# Patient Record
Sex: FEMALE | Race: WHITE
[De-identification: names, ages, dates, MRNs, and addresses within clinical notes are randomized per-mention and may not be internally consistent; named-entity substitution may affect disease eponyms.]

---

## 2017-08-18 NOTE — CARD
--------------- APPROVED REPORT --------------





EXAM: Two-dimensional and M-mode echocardiogram with Doppler and color Doppler.



Other Information 

Quality : Fair



INDICATION

Pulmonary Hypertention 



2D DIMENSIONS 

RVDd3.4 (2.9-3.5cm)Left Atrium(2D)4.2 (1.6-4.0cm)

IVSd1.1 (0.7-1.1cm)Aortic Root(2D)2.2 (2.0-3.7cm)

LVDd4.4 (3.9-5.9cm)LVOT Diameter1.9 (1.8-2.4cm)

PWd1.2 (0.7-1.1cm)LVDs2.7 (2.5-4.0cm)

FS (%) 30.0 %SV58.9 ml

LVEF(%)60.0 (>50%)



Aortic Valve

AoV Peak Kyle.219.2cm/sAoV VTI48.1cm

AO Peak GR.19.2mmHgLVOT Peak Kyle.124.5cm/s

AO Mean GR.12mmHgAVA (VMAX)1.58cm2



Mitral Valve

MV E Ycizkxcz65.1cm/sMV DECEL [id number]

MV A Zwqqxruw472.4cm/sE/A  Ratio0.7



Tricuspid Valve

TR P. Sshgrbto593bd/sRAP GZLKZUXP0lyEb

TR Peak Gr.96ycNnTKKA95vkOy



Pulmonary Vein

S1 Csfhfawm95.9cm/sD2 Ejgiekcs56.6cm/s

PVa [id number]



 LEFT VENTRICLE 

The left ventricle is normal size. There is mild concentric left ventricular hypertrophy. The left ve
ntricular systolic function is normal and the ejection fraction is within normal range. The Ejection 
Fraction is 60-65%. There is normal LV segmental wall motion. Transmitral Doppler flow pattern is Gra
de I-abnormal relaxation pattern.



 RIGHT VENTRICLE 

The right ventricle is normal size. The right ventricular systolic function is normal. There is a pac
emaker lead in the right ventricle.



 ATRIA 

The left atrium is mildly dilated. The right atrium is mildly dilated. A pacemaker is seen in the rig
ht atrium consistent with history. The interatrial septum is intact with no evidence for an atrial se
ptal defect or patent foramen ovale as noted on 2-D or Doppler imaging.



 AORTIC VALVE 

The aortic valve is calcified but opens well. Doppler and Color Flow revealed no significant aortic r
egurgitation. There is no significant aortic valvular stenosis.



 MITRAL VALVE 

The mitral valve is calcified but opens well. There is no evidence of mitral valve prolapse. There is
 no mitral valve stenosis. Doppler and Color-flow revealed trace to mild mitral regurgitation.



 TRICUSPID VALVE 

The tricuspid valve is normal in structure and function. Doppler and Color Flow revealed physiologica
l tricuspid regurgitation. There is mild pulmonary hypertension. The PA pressure was estimated at 36 
mmHg. There is no tricuspid valve stenosis.



 PULMONIC VALVE 

Doppler and Color Flow revealed trace pulmonic valvular regurgitation. There is no pulmonic valvular 
stenosis.



 GREAT VESSELS 

The aortic root is normal in size. The ascending aorta is normal in size. The IVC is normal in size a
nd collapses >50% with inspiration.



 PERICARDIAL EFFUSION 

There is no evidence of significant pericardial effusion.



Critical Notification

Critical Value: No



<Conclusion>

The left ventricular systolic function is normal and the ejection fraction is within normal range. Th
e Ejection Fraction is 60-65%.

There is normal LV segmental wall motion.

There is a pacemaker lead in the right ventricle.

## 2017-10-09 NOTE — CARD
--------------- APPROVED REPORT --------------





Procedure(s) performed: Right Heart Cath, Coronaries, LV

Moderate Sedation: 51 Minutes



HISTORY

The patient is a 77 year-old female with a history of : previous CHF, coronary artery disease, hypert
ension, dyslipidemia.



INDICATION

The indication(s) include : dyspnea.



PROCEDURE NARRATIVE

The patient was brought electively to the cardiac catheterization lab.  A timeout was performed confi
rming the patient's name, date of birth, procedure, and site of procedure.  All necessary personnel w
ere wearing the appropriate protective equipment and radiation monitor devices.  After explaining the
 risks and benefits of the procedure and alternatives, informed consent was obtained. (See nursing no
yoan for medications administered).  The right groin was sterilely prepped and draped in the usual fas
hion.  The right groin was infiltrated with 20 mL of 2% lidocaine for subcutaneous anesthesia.  A 5 F
sheath was inserted into the right femoral artery without difficulty via the modified seldinger techn
ique with an 18G needle and a J-tipped guidewire. Next, an 6Fr sheath was inserted in the right commo
n femoral vein in similar fashion without difficulty.  



A PA catheter was then advanced through the right heart chambers, pressures and saturations were obta
ined. Subsequently, right and left coronary angiography was performed using standard JR4 and JL4 diag
nostic catheters.  Left ventricular end diastolic pressure was obtained with a pigtail catheter and p
ullback was performed after left ventriculography. 



HEMODYNAMICS: 

LVEDP 21 mm Hg

AO: 156/82

*No gradient on LV to aortic pullback. 



PCWP: 20 mm Hg

PA: 54/26/38

RV: 57/10/16

RA: 11 mm Hg

Jt: 4.5 L/min



PA saturation: 73%

FA saturation: 95%



LEFT VENTRICULOGRAM: Deferred due to renal insufficiency.



CORONARY ANGIOGRAPHY: 

LM is a moderate to large caliber vessel with normal angiographic appearance. 

LAD is a moderate caliber vessel with patent mid stents. 

D1 is a small caliber vessel with an ostial 70% stenosis. There is an epicardial collateral to the di
stal segment of this vessel. 

LCx is a small to moderate caliber non-dominant vessel with mild luminal irregularities. 

RCA is a large caliber dominant vessel with patent proximal and mid stents and mild luminal irregular
ities. 

RPDA and RPL are small to moderate caliber vessels with normal angiographic appearance.



Conclusion

1. No significant coronary artery disease, patent LAD and RCA stents. 

2. No significant pulmonary HTN. 

3. No clear cardiac source of exertional dyspnea noted. 



Recommendations

Aggressive Medical Therapy

## 2017-10-09 NOTE — PDOC
MODERATE SEDATION ASSESSMENT


RISKS/ALTERNATIVES


Risks/Alternatives


Risks and alternatives of this type of sedation and procedure discussed with:


RISK/ALTERNATIVES:  Patient





H & P ON CHART


H & P


H & P on chart and reviewed for co-morbid conditions and appropriate labs.


H&P ON CHART:  Yes





PREGNANCY STATUS


PREG STATUS ASSESSED:  N/A





MEDS/ALLERGIES REVIEWED


Meds/Allergies Reviewed


Medications and Allergies including time and route of recently administered 

narcotics and sedatives.


MEDS/ALLERGIES REVIEWED:  Yes





ASA RATING


ASA RATING:  III





AIRWAY ASSESSMENT


Airway Assessment


Airway patency, oral function limitations, presence  of caps, crowns, dentures, 

partials, and ability to extend neck assessed.


AIRWAY ASSESSMENT:  Yes





MALLAMPATI SCORE


MALLAMPATI SCORE:  II





PRE-SEDATION ASSESSMENT


PRE-SEDATION ASSESSMENT:  Yes











HYUN BELLA MD Oct 9, 2017 09:08

## 2017-11-29 NOTE — PHYS DOC
Past Medical History


Past Medical History:  Anemia, CAD, COPD, Depression, Diabetes-Type II, High 

Cholesterol, Hypertension, Hypothyroid, Renal Disease


Past Surgical History:  Angioplasty, Appendectomy, Cholecystectomy, Hysterectomy

, Pacemaker, Tonsillectomy


Additional Past Surgical Histo:  6 cardiac stents,


Alcohol Use:  None


Drug Use:  None





Adult General


Chief Complaint


Chief Complaint:  HIP PAIN





[facility]


HPI





Patient is a 77 year old female with history of chronic back pain who presents 

with persistent right lower paralumbar paravertebral back pain radiating to 

right leg for the past several days. Patient states she fell 4 days prior to 

arrival has had increased pain. Patient has been able to her in the process of 

moving is been lifting boxes, bending and twisting repeatedly which is more 

than her normal activity. Has had increased pain but did not receive medical 

evaluation until today. Patient was Rx'd Hydrocodone and is awaiting 

arrangements for outpatient imaging studies. Patient states she took 

hydrocodone 3 hours prior to ED arrival with limited improvement. Denies motor 

weakness or loss of sensation. No bladder incontinence. No other acute symptoms 

or complaints.





Review of Systems


Review of Systems





ROS as per HPI.  All other ROS are negative.]





All other systems were reviewed and found to be within normal limits, except as 

documented in this note.





Current Medications


Current Medications





Current Medications








 Medications


  (Trade)  Dose


 Ordered  Sig/Nga  Start Time


 Stop Time Status Last Admin


Dose Admin


 


 Fentanyl Citrate


  (Fentanyl 2ml


 Vial)  150 mcg  1X  ONCE  17 21:30


 17 21:31 DC 17 21:25


150 MCG


 


 Ketorolac


 Tromethamine


  (Toradol Im)  60 mg  1X  ONCE  17 00:30


 17 00:31 DC 17 00:23


60 MG


 


 Ondansetron HCl


  (Zofran Odt)  4 mg  1X  ONCE  17 21:30


 17 21:31 DC 17 21:24


4 MG











Allergies


Allergies





Allergies








Coded Allergies Type Severity Reaction Last Updated Verified


 


  Iodinated Contrast- Oral and IV Dye Allergy Intermediate  10/31/16 Yes


 


  Tetracyclines Allergy Intermediate  10/31/16 Yes


 


  morphine Allergy Intermediate  10/31/16 Yes


 


  nitrofurantoin Allergy Intermediate  10/31/16 Yes











Physical Exam


Physical Exam





Constitutional: Well developed, well nourished, discomfort secondary to pain.[]


HENT: Normocephalic, atraumatic, bilateral external ears normal, oropharynx 

moist, no oral exudates, nose normal. []


Eyes: PERRLA, EOMI, conjunctiva normal. [] 


Neck: Normal range of motion. [] 


Cardiovascular:Heart rate regular rhythm, no murmur []


Lungs & Thorax:  Bilateral breath sounds clear to auscultation []


Abdomen: Bowel sounds normal, soft, no tenderness. [] 


Skin: Warm, dry, no erythema, no rash. [] 


Back: Diffuse low back pain, no midline bony tenderness, swelling or step off 

appreciated. [] 


Extremities: R hip, no deformity swelling, hip rotation, swelling or shortening 

of the extremity.. [] 


Neurologic: Alert and oriented X 3, R lower extremity , normal motor function, 

normal sensory function, no focal deficits noted. []


Psychologic: Affect, anxious. []





Current Patient Data


Vital Signs





 Vital Signs








  Date Time  Temp Pulse Resp B/P (MAP) Pulse Ox O2 Delivery O2 Flow Rate FiO2


 


17 22:15      Room Air  


 


17 20:58 98.3 70 20 177/76 (109) 98   





 98.3       











EKG


EKG


[]





Radiology/Procedures


Radiology/Procedures


[X-ray right hip/lumbar series: No obvious displaced fracture.





CT lumbar spine/pelvis: No acute findings per radiology report]





Course & Med Decision Making


Course & Med Decision Making


Pertinent Labs and Imaging studies reviewed. (See chart for details)





[No for occult neurologic deficits. Pain addressed. Showing stenosis likely 

contributing to pain. PCP follow up. ]





Dragon Disclaimer


Dragon Disclaimer


This electronic medical record was generated, in whole or in part, using a 

voice recognition dictation system.





Departure


Departure


Impression:  


 Primary Impression:  


 Back pain


 Additional Impression:  


 Lumbar radicular pain


Disposition:   HOME, SELF-CARE


Condition:  


Referrals:  


DANIELLA LINTON MD (PCP)





Problem Qualifiers











YARY FAUSTIN DO 2017 22:01

## 2017-11-29 NOTE — RAD
CT lumbar spine without contrast

 

History: Back pain

 

Axial helical images of the lumbar spine were obtained without contrast. 

Axial, coronal and sagittal reconstruction was performed.

 

Findings:

 

There is grade 1 anterolisthesis of L4 on L5 and L5 on S1. There is no 

loss of vertebral body stature. 

 

Evaluation of the central canal is limited without contrast. 

 

Diffuse circumferential disc bulge and hypertrophy of the facets and 

ligamentum flavum results in moderate-to-marked central stenosis at L4-5 

with crowding of the lateral recesses lateral recesses bilaterally right 

worse than left. There is moderate narrowing of the neuroforamen 

bilaterally at L4-5 and L5-S1 with loss of fat around the exiting nerve 

roots bilaterally at L4-L5.

 

There is a 2.4 cm intermediate density lesion arising from the mid right 

kidney posterior laterally.

 

Impression:

 

 

1. Marked degenerative changes of the lumbar spine with significant 

central and neuroforaminal stenosis at L4-L5.

2. There appears to be compression of the intraforaminal course of the 

exiting nerve roots bilaterally at this level however, there is also 

likely compression of the L5 nerve roots before the end of the 

neuroforamen.

3. Indeterminate lesion in the right kidney. Recommend follow-up 

ultrasound of the kidneys as an outpatient.

 

PQRS Compliance Statement:

 

One or more of the following individualized dose reduction techniques were

utilized for this examination:  

1. Automated exposure control  

2. Adjustment of the mA and/or kV according to patient size  

3. Use of iterative reconstruction technique

 

Electronically signed by: Sage Sosa III, MD (11/29/2017 10:26 PM) 

[facility]

## 2017-11-29 NOTE — RAD
CT of pelvis without contrast:

 

HISTORY: Right hip pain

 

Axial helical images of the pelvis were obtained without contrast.

 

 

 

The uterus is not well seen and could be small or surgically absent.  The 

ovaries are not well seen and are likely small.  There is no 

lymphadenopathy or free fluid. The bladder appears normal. There is no 

pericolonic inflammation.

 

The visualized osseous structures appear intact.

 

Impression:

 

No acute findings.

 

 

PQRS Compliance Statement:

 

One or more of the following individualized dose reduction techniques were

utilized for this examination:  

1. Automated exposure control  

2. Adjustment of the mA and/or kV according to patient size  

3. Use of iterative reconstruction technique

 

Electronically signed by: Sage Ssoa III, MD (11/29/2017 10:41 PM) 

[facility]

## 2017-11-30 NOTE — RAD
Lumbar spine, 3 views, 11/29/2017:



History: Back pain, injury



There is moderate disc space narrowing and marginal spurring at L5-S1. The

other intervertebral disc spaces are well-maintained. There are scattered mild

marginal spurs. There are moderate degenerative changes involving the facet

joints in the lower lumbar spine. There is a minimal anterolisthesis at L5-S1.

No acute fracture is identified. Aortic calcific plaquing is present.



IMPRESSION:

1. Moderate degenerative change in the lower lumbar spine.

2. No acute bony abnormality is detected.

## 2017-11-30 NOTE — RAD
Pelvis with right hip, 3 views, 11/29/2017:



History: Trauma, injury



No fracture or dislocation is identified. The hip joint spaces are well

preserved with only mild marginal spurring. There are degenerative changes at

the symphysis pubis and in the lower lumbar spine. Surgical clips are present

the right groin. Aortoiliac calcific plaquing is noted.



IMPRESSION: No acute bony abnormality is detected.

## 2017-12-04 NOTE — PHYS DOC
Past Medical History


Past Medical History:  Anemia, CAD, COPD, Depression, Diabetes-Type II, High 

Cholesterol, Hypertension, Hypothyroid, Renal Disease


Past Surgical History:  Angioplasty, Appendectomy, Cholecystectomy, Hysterectomy

, Pacemaker, Tonsillectomy


Additional Past Surgical Histo:  6 cardiac stents,


Alcohol Use:  None


Drug Use:  None





Adult General


Chief Complaint


Chief Complaint:  HIP PAIN





[facility]


HPI





Patient is a 77  year old female with history of COPD, diabetes type 2, 

hypertension, anemia, who presents today complaining of moderate right low back 

pain radiating into the right lower extremity that began on Thanksgiving day 

after she fell. Patient was seen in the ED on November 29, 2017, she had lumbar 

spine CTs which were negative for any acute findings and right hip x-rays which 

were negative for any acute findings. She states she was sent home with 

hydrocodone and Flexeril. Patient states her pain is still ongoing. Patient 

denies any injury. Denies any loss of bowel bladder function. Denies any 

numbness or tingling to bilateral lower extremities. She states she has an 

appointment with her PCP  tomorrow morning.





Review of Systems


Review of Systems





Constitutional: Denies fever or chills []


Eyes: Denies change in visual acuity, redness, or eye pain []


HENT: Denies nasal congestion or sore throat []


Respiratory: Denies cough or shortness of breath []


Cardiovascular: No additional information not addressed in HPI []


GI: Denies abdominal pain, nausea, vomiting, bloody stools or diarrhea []


: Denies dysuria or hematuria []


Musculoskeletal: Right low back pain radiating into the right lower extremity


Integument: Denies rash or skin lesions []


Neurologic: Denies headache, focal weakness or sensory changes []








All other systems were reviewed and found to be within normal limits, except as 

documented in this note.





Current Medications


Current Medications





Current Medications








 Medications


  (Trade)  Dose


 Ordered  Sig/Nga  Start Time


 Stop Time Status Last Admin


Dose Admin


 


 Diazepam


  (Valium)  5 mg  1X  ONCE  12/4/17 20:30


 12/4/17 20:31 DC  


 


 


 Fentanyl Citrate


  (Fentanyl 2ml


 Vial)  50 mcg  1X  ONCE  12/4/17 20:30


 12/4/17 20:31 DC  


 


 


 Ketorolac


 Tromethamine


  (Toradol Im)  60 mg  1X  ONCE  12/4/17 20:30


 12/4/17 20:31 DC  


 











Allergies


Allergies





Allergies








Coded Allergies Type Severity Reaction Last Updated Verified


 


  Iodinated Contrast- Oral and IV Dye Allergy Intermediate  10/31/16 Yes


 


  Tetracyclines Allergy Intermediate  10/31/16 Yes


 


  morphine Allergy Intermediate  10/31/16 Yes


 


  nitrofurantoin Allergy Intermediate  10/31/16 Yes











Physical Exam


Physical Exam





Constitutional: Well developed, well nourished, no acute distress, non-toxic 

appearance. []


HENT: Normocephalic, atraumatic, bilateral external ears normal, oropharynx 

moist, no oral exudates, nose normal. []


Eyes: PERRLA, EOMI, conjunctiva normal, no discharge. [] 


Neck: Normal range of motion, no tenderness, supple, no stridor. [] 


Cardiovascular:Heart rate regular rhythm, no murmur []


Lungs & Thorax:  Bilateral breath sounds clear to auscultation []


Abdomen: Bowel sounds normal, soft, no tenderness, no masses, no pulsatile 

masses. [] 


Skin: Warm, dry, no erythema, no rash. [] 


Back: Diffuse tenderness to the right lumbar spine worse on the right SI joint, 

no midline lumbar spine tenderness, no CVA tenderness. [] 


Extremities: No tenderness, no cyanosis, no clubbing, ROM intact, no edema. [] 


Neurologic: Alert and oriented X 3, normal motor function, normal sensory 

function, no focal deficits noted. []


Psychologic: Affect normal, judgement normal, mood normal. []





Current Patient Data


Vital Signs





 Vital Signs








  Date Time  Temp Pulse Resp B/P (MAP) Pulse Ox O2 Delivery O2 Flow Rate FiO2


 


12/4/17 19:50 97.6 95 16  98 Room Air  





 97.6       











EKG


EKG


[]





Radiology/Procedures


Radiology/Procedures


[]





Course & Med Decision Making


Course & Med Decision Making


Pertinent Labs and Imaging studies reviewed. (See chart for details)





Patient is in the ED with complaints of right low-back pain radiating into the 

right lower extremity that began on Thanksgiving day after she fell. They did a 

CT  of her lumbar spine as well as x-rays of the right hip which were negative 

for any acute findings. She is currently taking cyclobenzaprine and hydrocodone 

with no relief. I talked to patient about management of her pain considering 

she is 77 years old. Informed her she can be admitted for pain management. She 

states she has an appointment with her PCP tomorrow morning. She'll be given 

Toradol and Valium and fentanyl in the ED. She'll be discharged with Valium and 

to continue taking her fentanyl and follow-up with her own PCP tomorrow morning 

at 11 AM. Recommended pain clinic doctor follow up as well.





Dragon Disclaimer


Dragon Disclaimer


This electronic medical record was generated, in whole or in part, using a 

voice recognition dictation system.





Departure


Departure


Impression:  


 Primary Impression:  


 Back pain


 Additional Impression:  


 Lumbar radicular pain


Disposition:  01 HOME, SELF-CARE


Condition:  STABLE


Referrals:  


DANIELLA LINTON MD (PCP)


follow up tomorrow





MARGI FRANCIS MD


follow up in one week


Patient Instructions:  Back Pain, Adult, Sciatica, Easy-to-Read





Additional Instructions:  


You were seen with pain radiating to the right lower extremity. You have an 

appointment with their doctor tomorrow. Ensure you follow-up. We also provided 

you a pain clinic doctor, contact the office tomorrow and set up a follow-up 

appointment. Return to the ED at any point symptoms worsen.


Scripts


Hydrocodone Bit/Acetaminophen (HYDROCODONE-APAP 7.5-325  **) 1 Each Tablet


1 TAB PO PRN Q6HRS Y for PAIN, #10 TAB 0 Refills


   Prov: ROSALVA SPIVEY         12/4/17 


Diazepam (VALIUM) 5 Mg Tablet


5 MG PO TID, #15 TAB


   Prov: ROSALVA SPIVEY         12/4/17





Problem Qualifiers








 Primary Impression:  


 Back pain


 Back pain location:  low back pain  Chronicity:  acute  Back pain laterality:  

right  Sciatica presence:  with sciatica  Sciatica laterality:  sciatica of 

right side  Qualified Codes:  M54.41 - Lumbago with sciatica, right side








ROSALVA SPIVEY Dec 4, 2017 20:32

## 2017-12-12 NOTE — KCIC
RENAL ARTERY ARTERIAL DOPPLER ULTRASOUND

 

Indication: Chronic kidney disease stage III. Kidney mass seen on CT 

lumbar spine.

 

Comparison: CT lumbar spine without contrast, November 29, 2017.

 

Technique: Multiple grayscale, color flow, and Doppler spectral waveform 

analysis images of the abdominal aorta and renal arteries were obtained.

 

Findings: 

Left kidney technically difficult to visualize. Patient unable to lay flat

or towards the right.

 

Abdominal aorta peak systolic velocity: 94 cm/sec.

 

Right main renal artery peak systolic velocity: 93 cm/sec. 

Right renal artery to aorta ratio: 1

 

Left main renal artery peak systolic velocity: 100 cm/sec.

Left renal artery to aorta ratio: 1.1

 

The bilateral proximal renal arteries are unable to be visualized.

 

Renal veins are patent. 

 

The right kidney measures 8.6 cm. The left kidney measures 10.2 cm. 

Kidneys are normal in echotexture. No hydronephrosis. There is a right 

upper pole round hypoechoic lesion measuring 2.4 x 2.1 x 2.5 cm. There may

be increased through transmission. The margins are irregular. Do not see a

color Doppler interrogation image of the lesion.

 

Urinary bladder is not well seen, no obvious abnormality.

 

IMPRESSION:

1.  The lesion in the upper pole of the right kidney is indeterminate by 

ultrasound. There are internal echoes and irregular margins. A color 

Doppler image interrogating this lesion is not saved. Recommend further 

evaluation with multiphase CT abdomen with and without contrast.

2.  No evidence of hemodynamically significant renal artery stenosis.

 

Electronically signed by: Desmond Huertas MD (12/12/2017 12:56 PM) [id number]

## 2018-10-17 NOTE — CARD
MR#: [id number]

Account#: [id number]

Accession#: [id number]

Date of Study: 10/17/2018

Ordering Physician: HYUN CH, 

Referring Physician: HYUN CH, 

Tech: Elvie Camara





--------------- APPROVED REPORT --------------





EXAM: Two-dimensional and M-mode echocardiogram with Doppler and color Doppler.



Other Information 

Quality : FairHR: 60bpm



INDICATION

COPD  

Coronary Arteriosclerosis



Surgery/Intervention

Pacemaker: 



RISK FACTORS

Hypertension 

Obesity   

Hyperlipidemia

Diabetes



2D DIMENSIONS 

Left Atrium(2D)4.3 (1.6-4.0cm)IVSd1.0 (0.7-1.1cm)

Aortic Root(2D)2.9 (2.0-3.7cm)LVDd5.5 (3.9-5.9cm)

LVOT Diameter2.1 (1.8-2.4cm)PWd1.3 (0.7-1.1cm)

LVDs3.0 (2.5-4.0cm)FS (%) 45.1 %

.9 mlLVEF(%)75.9 (>50%)



Aortic Valve

AoV Peak Kyle.246.9cm/sAoV VTI61.3cm

AO Peak GR.24.4mmHgLVOT Peak Kyle.90.4cm/s

LVOT  VTI 19.09cmAO Mean GR.14mmHg

THOMAS (VMAX)0.93kx8CWI   (VTI)1.09cm2



Mitral Valve

MV E Ikqhngry493.5cm/sMV DECEL [id number]

MV A Qsfeuqlx368.4cm/sMV DMO59bj

E/A  Ratio1.1MVA (PHT)5.30cm2



TDI

E/Lateral E'17.7E/Medial E'16.1



Pulmonary Valve

PV Peak Xojzsrsx503.4cm/sPV Peak Grad.7mmHg



Tricuspid Valve

TR P. Yzqbpeid718vg/sRAP CBFXPAKX0hdUc

TR Peak Gr.63jsHpFOEV16jrCq



Pulmonary Vein

S1 Usppzoil80.4cm/sD2 Nlukhhcs10.0cm/s



 LEFT VENTRICLE 

The Left Ventricle is mildly dilated. There is borderline concentric left ventricular hypertrophy. Th
e left ventricular systolic function is normal and the ejection fraction is within normal range. The 
Ejection Fraction is 55-60%. Suboptimal images. Grossly normal wall motion. Transmitral Doppler flow 
pattern is Grade I-abnormal relaxation pattern.



 RIGHT VENTRICLE 

The right ventricle is normal size. There is normal right ventricular wall thickness. The right ventr
icular systolic function is normal.



 ATRIA 

The left atrium size is normal. The right atrium size is normal. The interatrial septum is intact wit
h no evidence for an atrial septal defect or patent foramen ovale as noted on 2-D or Doppler imaging.




 AORTIC VALVE 

The aortic valve is calcified and displays decreased opening. Doppler and Color Flow revealed trace a
ortic regurgitation. Calculated aortic valve area is 1.3 cm2 with maximum pressure gradient of 25 mmH
g and mean pressure gradient of 14 mmHg.



 MITRAL VALVE 

The mitral valve is normal in structure and function. There is no mitral valve stenosis. Doppler and 
Color-flow revealed trace mitral regurgitation.



 TRICUSPID VALVE 

The tricuspid valve is not well visualized. Doppler and Color Flow revealed trace tricuspid regurgita
tion. RVSP of 26 mm Hg. There is no tricuspid valve stenosis. 



 PULMONIC VALVE 

The pulmonic valve is not well visualized. Doppler and Color Flow revealed trace pulmonic valvular re
gurgitation. There is no pulmonic valvular stenosis.



 GREAT VESSELS 

The aortic root is normal in size. The IVC was not visualized.



 PERICARDIAL EFFUSION 

There is no evidence of significant pericardial effusion.



Critical Notification

Critical Value: No



<Conclusion>

The left ventricular systolic function is normal and the ejection fraction is within normal range. Th
e Ejection Fraction is 55-60%.

Suboptimal images. Grossly normal wall motion.

Probable mild aortic stenosis. 

Doppler and Color Flow revealed trace tricuspid regurgitation. RVSP of 26 mm Hg.



Signed by : Hyun Ch, 

Electronically Approved : 10/17/2018 09:48:28

## 2019-02-26 NOTE — PHYS DOC
Past Medical History


Past Medical History:  Anemia, Anxiety, CAD, COPD, Depression, Diabetes-Type II

, High Cholesterol, Hypertension, Hypothyroid, Renal Disease


Past Surgical History:  Angioplasty, Appendectomy, Cholecystectomy, Hysterectomy

, Pacemaker, Tonsillectomy


Additional Past Surgical Histo:  6 cardiac stents,


Alcohol Use:  Sober


Drug Use:  None





Adult General


Chief Complaint


Chief Complaint:  CHEST PAIN





HPI


HPI





Patient is a 79-year-old female who presents with complaint of chest discomfort 

that started earlier today at about 12:30 PM. Patient states that she thinks it 

may just be an anxiety attack. She rates pain at a 2 out of 10 and states that 

it feels like a tightness. She states that at times it goes all the way across 

her chest but currently is just in the center of her chest. She denies any 

nausea, vomiting or diaphoresis. She states that pain is worsened with exertion.





Review of Systems


Review of Systems





Constitutional: Denies fever or chills []


Respiratory: Denies cough or shortness of breath []


Cardiovascular: No additional information not addressed in HPI []


GI: Denies abdominal pain, nausea, vomiting or diarrhea []


Neurologic: Denies headache, focal weakness or sensory changes []





All other systems were reviewed and found to be within normal limits, except as 

documented in this note.





Current Medications


Current Medications





Current Medications








 Medications


  (Trade)  Dose


 Ordered  Sig/[facility]  Start Time


 Stop Time Status Last Admin


Dose Admin


 


 Aspirin


  ([facility]


 Aspirin)  324 mg  1X  ONCE  2/26/19 18:30


 2/26/19 18:31 DC 2/26/19 19:25


324 MG


 


 Lorazepam


  (Ativan)  1 mg  1X  ONCE  2/26/19 18:30


 2/26/19 18:31 DC 2/26/19 19:26


1 MG


 


 Nitroglycerin


  (Nitrostat)  0.4 mg  PRN Q5MIN  PRN  2/26/19 18:30


 2/27/19 18:29  2/26/19 19:29


0.4 MG


 


 Sodium Chloride  1,000 ml @ 


 100 mls/hr  Q10H  2/26/19 18:26


 2/27/19 04:25  2/26/19 19:25


100 MLS/HR











Allergies


Allergies





Allergies








Coded Allergies Type Severity Reaction Last Updated Verified


 


  Iodinated Contrast- Oral and IV Dye Allergy Intermediate  10/31/16 Yes


 


  Tetracyclines Allergy Intermediate  10/31/16 Yes


 


  morphine Allergy Intermediate  10/31/16 Yes


 


  nitrofurantoin Allergy Intermediate  10/31/16 Yes











Physical Exam


Physical Exam





Constitutional: Well developed, well nourished, no acute distress, non-toxic 

appearance. []


HENT: Normocephalic, atraumatic, bilateral external ears normal, oropharynx 

moist, no oral exudates, nose normal. []


Eyes: PERRLA, EOMI, conjunctiva normal, no discharge. [] 


Neck: Normal range of motion, no tenderness, supple, no stridor. [] 


Cardiovascular: Regular rate and rhythm[]


Lungs & Thorax:  Bilateral breath sounds clear to auscultation []


Abdomen: Bowel sounds normal, soft, no tenderness. [] 


Skin: Warm, dry, no erythema, no rash. [] 


Extremities: No tenderness, no cyanosis, no clubbing, ROM intact, with 

nonpitting edema. [] 


Neurologic: Alert and oriented X 3, no focal deficits noted. []


Psychologic: Anxious. []





Current Patient Data


Vital Signs





 Vital Signs








  Date Time  Temp Pulse Resp B/P (MAP) Pulse Ox O2 Delivery O2 Flow Rate FiO2


 


2/26/19 19:29  70  168/66    


 


2/26/19 18:21 98.0  18  99 Room Air  





 98.0       








Lab Values





 Laboratory Tests








Test


 2/26/19


18:33


 


White Blood Count


 5.9 x10^3/uL


(4.0-11.0)


 


Red Blood Count


 3.82 x10^6/uL


(3.50-5.40)


 


Hemoglobin


 11.6 g/dL


(12.0-15.5)  L


 


Hematocrit


 35.7 %


(36.0-47.0)  L


 


Mean Corpuscular Volume


 94 fL ()





 


Mean Corpuscular Hemoglobin 30 pg (25-35)  


 


Mean Corpuscular Hemoglobin


Concent 32 g/dL


(31-37)


 


Red Cell Distribution Width


 13.4 %


(11.5-14.5)


 


Platelet Count


 221 x10^3/uL


(140-400)


 


Neutrophils (%) (Auto) 57 % (31-73)  


 


Lymphocytes (%) (Auto) 30 % (24-48)  


 


Monocytes (%) (Auto) 7 % (0-9)  


 


Eosinophils (%) (Auto) 5 % (0-3)  H


 


Basophils (%) (Auto) 1 % (0-3)  


 


Neutrophils # (Auto)


 3.4 x10^3uL


(1.8-7.7)


 


Lymphocytes # (Auto)


 1.8 x10^3/uL


(1.0-4.8)


 


Monocytes # (Auto)


 0.4 x10^3/uL


(0.0-1.1)


 


Eosinophils # (Auto)


 0.3 x10^3/uL


(0.0-0.7)


 


Basophils # (Auto)


 0.1 x10^3/uL


(0.0-0.2)


 


Sodium Level


 143 mmol/L


(136-145)


 


Potassium Level


 3.9 mmol/L


(3.5-5.1)


 


Chloride Level


 104 mmol/L


()


 


Carbon Dioxide Level


 27 mmol/L


(21-32)


 


Anion Gap 12 (6-14)  


 


Blood Urea Nitrogen


 37 mg/dL


(7-20)  H


 


Creatinine


 1.5 mg/dL


(0.6-1.0)  H


 


Estimated GFR


(Cockcroft-Gault) 33.7  





 


BUN/Creatinine Ratio 25 (6-20)  H


 


Glucose Level


 94 mg/dL


(70-99)


 


Calcium Level


 9.3 mg/dL


(8.5-10.1)


 


Magnesium Level


 1.5 mg/dL


(1.8-2.4)  L


 


Total Bilirubin


 0.2 mg/dL


(0.2-1.0)


 


Aspartate Amino Transferase


(AST) 15 U/L (15-37)





 


Alanine Aminotransferase (ALT)


 15 U/L (14-59)





 


Alkaline Phosphatase


 61 U/L


()


 


Troponin I Quantitative


 < 0.017 ng/mL


(0.000-0.055)


 


NT-Pro-B-Type Natriuretic


Peptide 249 pg/mL


(0-449)


 


Total Protein


 7.6 g/dL


(6.4-8.2)


 


Albumin


 3.2 g/dL


(3.4-5.0)  L


 


Albumin/Globulin Ratio


 0.7 (1.0-1.7)


L


 


Lipase


 604 U/L


()  H





 Laboratory Tests


2/26/19 18:33








 Laboratory Tests


2/26/19 18:33











EKG


EKG


[]


Interpretation Time:


EKG demonstrates a ventricular paced rhythm.





Radiology/Procedures


Radiology/Procedures


[]





Course & Med Decision Making


Course & Med Decision Making


Pertinent Labs and Imaging studies reviewed. (See chart for details)





[]





Dragon Disclaimer


Dragon Disclaimer


This electronic medical record was generated, in whole or in part, using a 

voice recognition dictation system.





Departure


Departure


Impression:  


 Primary Impression:  


 Chest pain


Disposition:  09 ADMITTED AS INPATIENT


Admitting Physician:  eDx Arrieta


Condition:  IMPROVED


Referrals:  


DANIELLA LINTON MD (PCP)





Problem Qualifiers








 Primary Impression:  


 Chest pain


 Chest pain type:  unspecified  Qualified Codes:  R07.9 - Chest pain, 

unspecified








MERCED MARCANO Jr. DO Feb 26, 2019 18:30

## 2019-02-26 NOTE — NUR
Pt arrived to room 203 per cart accompanied by son's at 2110. Pt assisted to bed with 
assist. Pt denies pain at this time. Poc explained assesment completed vs obtained and 
stable. Call light in reach will resume care and continue to monitor pt.

## 2019-02-27 NOTE — PDOC1
History and Physical


Date of Admission


Date of Admission


92/27/2019





Identification/Chief Complaint


Chief Complaint


My chest hurts





Source


Source:  Chart review, Patient





History of Present Illness


History of Present Illness


Jass is a79 year old female with multiple comorbidities who was in her usual 

state of health until the day prior to her admission when she reported a 

discomfort over ther chest that she describes as a pressure "around her chest" 

Patient says the episodes are intermittent lasting probably less than 10 minutes

, she deneis diaphoresis no sensation of impending doom, no nausea or 

respiratory distres reported no histoyr of recent cough sputum production fever 

or chills reported. SHe sii being admitted at the request of the ER for further 

evaluation anad treatment of her chest discomfort





Of note is that the patient has some social issues at home that probably 

trigger her symtpoms that byron ther to the hospital work up has been negative 

so far.





Past Medical History


Cardiovascular:  CAD, HTN, MI, Hyperlipidemia, Other


Pulmonary:  COPD


CENTRAL NERVOUS SYSTEM:  Periperal neuropathy


GI:  GERD


Heme/Onc:  No pertinent hx


Hepatobiliary:  No pertinent hx


Psych:  No pertinent hx


Rheumatologic:  No pertinent hx


Infectious disease:  No pertinent hx


Renal/:  No pertinent hx


Endocrine:  Diabetes, Hypothyroidism





Past Surgical History


Past Surgical History:  Pacemaker, Appendectomy, Cholecystectomy, Tonsillectomy

, Hysterectomy, Other





Family History


Family History:  Coronary Artery Disease





Social History


Smoke:  No


ALCOHOL:  none


Drugs:  None





Current Problem List


Problem List


Problems


Medical Problems:


(1) Chest pain


Status: Acute  











Current Medications


Current Medications





Current Medications








 Medications


  (Trade)  Dose


 Ordered  Sig/[facility]  Start Time


 Stop Time Status Last Admin


Dose Admin


 


 Aspirin


  ([facility]


 Aspirin)  324 mg  1X  ONCE  2/26/19 18:30


 2/26/19 18:31 DC 2/26/19 19:25


324 MG


 


 Lorazepam


  (Ativan)  1 mg  1X  ONCE  2/26/19 18:30


 2/26/19 18:31 DC 2/26/19 19:26


1 MG


 


 Nitroglycerin


  (Nitrostat)  0.4 mg  PRN Q5MIN  PRN  2/26/19 20:15


 2/26/19 20:19 DC  





 


 Ondansetron HCl


  (Zofran)  4 mg  PRN Q8HRS  PRN  2/26/19 20:15


 2/27/19 20:14   





 


 Pantoprazole


 Sodium


  (Protonix)  40 mg  1X  ONCE  2/27/19 10:30


 2/27/19 10:31 DC 2/27/19 11:08


40 MG


 


 Sodium Chloride  1,000 ml @ 


 100 mls/hr  Q10H  2/26/19 18:26


 2/27/19 04:25 DC 2/26/19 19:25


100 MLS/HR











Allergies


Allergies





Allergies








Coded Allergies Type Severity Reaction Last Updated Verified


 


  Iodinated Contrast- Oral and IV Dye Allergy Intermediate  10/31/16 Yes


 


  Tetracyclines Allergy Intermediate  10/31/16 Yes


 


  morphine Allergy Intermediate  10/31/16 Yes


 


  nitrofurantoin Allergy Intermediate  10/31/16 Yes











ROS


Review of System


CONSTITUTIONAL:        No fever or chills


EYES:                          No recent changes


SKIN:               No rash or itching


CARDIOVASCULAR:     + chest pain, no syncope, palpitations, or edema


RESPIRATORY:            No SOB or cough


GASTROINTESTINAL:    No nausea, vomiting or abdominal pain


NEUROLOGICAL:          No headaches or weakness


ENDOCRINE:               No cold or heat intolerance


GENITOURINARY:         No urgency or frequency of urination


MUSCULOSKELETAL:   No back pain or joint pain


LYMPHATICS:               No enlarged lymph nodes


PSYCHIATRIC:              No anxiety or depression





Physical Exam


Physical Exam


Gen.: morbidly obese in no apparent distress


Head: Normal shape atraumatic


Eyes: Pupils equal reactive to light and accommodation, normal conjunctivae and 

lids


Ears: Normal shape


Nose: Normal shape no trauma


Mouth: No exudates of the back of throat no thrush no lesions


Neck: Supple no JVD no carotid bruit or lymphadenopathy no thyromegaly


Chest: distnat breath sounds due to body habitus Lungs clear to auscultation 

with good inspiratory effort no crackles rales or rhonchi


Cardiovascular: distant cardiac sounds due to body habitus.S1-S2 regular rhythm 

no murmurs gallops or rubs


Abdomen: Bowel sounds present soft nontender no hepatosplenomegaly appreciated 

sign Extremities: No clubbing no cyanosis no edema peripheral pulses palpated 

bilaterally


Neurological: Alert awake oriented in person time place and situation, cranial 

nerves II through XII intact, no motor or sensory deficits appreciated


Psych: Appropriate mood, cooperative





Vitals


Vitals





Vital Signs








  Date Time  Temp Pulse Resp B/P (MAP) Pulse Ox O2 Delivery O2 Flow Rate FiO2


 


2/27/19 15:30 97.4 66 20 162/73 (102) 98 Room Air  





 97.4       











Labs


Labs





Laboratory Tests








Test


 2/26/19


18:33 2/26/19


23:00 2/27/19


02:00


 


White Blood Count


 5.9 x10^3/uL


(4.0-11.0) 


 





 


Red Blood Count


 3.82 x10^6/uL


(3.50-5.40) 


 





 


Hemoglobin


 11.6 g/dL


(12.0-15.5) 


 





 


Hematocrit


 35.7 %


(36.0-47.0) 


 





 


Mean Corpuscular Volume 94 fL ()   


 


Mean Corpuscular Hemoglobin 30 pg (25-35)   


 


Mean Corpuscular Hemoglobin


Concent 32 g/dL


(31-37) 


 





 


Red Cell Distribution Width


 13.4 %


(11.5-14.5) 


 





 


Platelet Count


 221 x10^3/uL


(140-400) 


 





 


Neutrophils (%) (Auto) 57 % (31-73)   


 


Lymphocytes (%) (Auto) 30 % (24-48)   


 


Monocytes (%) (Auto) 7 % (0-9)   


 


Eosinophils (%) (Auto) 5 % (0-3)   


 


Basophils (%) (Auto) 1 % (0-3)   


 


Neutrophils # (Auto)


 3.4 x10^3uL


(1.8-7.7) 


 





 


Lymphocytes # (Auto)


 1.8 x10^3/uL


(1.0-4.8) 


 





 


Monocytes # (Auto)


 0.4 x10^3/uL


(0.0-1.1) 


 





 


Eosinophils # (Auto)


 0.3 x10^3/uL


(0.0-0.7) 


 





 


Basophils # (Auto)


 0.1 x10^3/uL


(0.0-0.2) 


 





 


Sodium Level


 143 mmol/L


(136-145) 


 





 


Potassium Level


 3.9 mmol/L


(3.5-5.1) 


 





 


Chloride Level


 104 mmol/L


() 


 





 


Carbon Dioxide Level


 27 mmol/L


(21-32) 


 





 


Anion Gap 12 (6-14)   


 


Blood Urea Nitrogen


 37 mg/dL


(7-20) 


 





 


Creatinine


 1.5 mg/dL


(0.6-1.0) 


 





 


Estimated GFR


(Cockcroft-Gault) 33.7 


 


 





 


BUN/Creatinine Ratio 25 (6-20)   


 


Glucose Level


 94 mg/dL


(70-99) 


 





 


Calcium Level


 9.3 mg/dL


(8.5-10.1) 


 





 


Magnesium Level


 1.5 mg/dL


(1.8-2.4) 


 





 


Total Bilirubin


 0.2 mg/dL


(0.2-1.0) 


 





 


Aspartate Amino Transf


(AST/SGOT) 15 U/L (15-37) 


 


 





 


Alanine Aminotransferase


(ALT/SGPT) 15 U/L (14-59) 


 


 





 


Alkaline Phosphatase


 61 U/L


() 


 





 


Troponin I Quantitative


 < 0.017 ng/mL


(0.000-0.055) < 0.017 ng/mL


(0.000-0.055) < 0.017 ng/mL


(0.000-0.055)


 


NT-Pro-B-Type Natriuretic


Peptide 249 pg/mL


(0-449) 


 





 


Total Protein


 7.6 g/dL


(6.4-8.2) 


 





 


Albumin


 3.2 g/dL


(3.4-5.0) 


 





 


Albumin/Globulin Ratio 0.7 (1.0-1.7)   


 


Lipase


 604 U/L


() 


 











Laboratory Tests








Test


 2/26/19


18:33 2/26/19


23:00 2/27/19


02:00


 


White Blood Count


 5.9 x10^3/uL


(4.0-11.0) 


 





 


Red Blood Count


 3.82 x10^6/uL


(3.50-5.40) 


 





 


Hemoglobin


 11.6 g/dL


(12.0-15.5) 


 





 


Hematocrit


 35.7 %


(36.0-47.0) 


 





 


Mean Corpuscular Volume 94 fL ()   


 


Mean Corpuscular Hemoglobin 30 pg (25-35)   


 


Mean Corpuscular Hemoglobin


Concent 32 g/dL


(31-37) 


 





 


Red Cell Distribution Width


 13.4 %


(11.5-14.5) 


 





 


Platelet Count


 221 x10^3/uL


(140-400) 


 





 


Neutrophils (%) (Auto) 57 % (31-73)   


 


Lymphocytes (%) (Auto) 30 % (24-48)   


 


Monocytes (%) (Auto) 7 % (0-9)   


 


Eosinophils (%) (Auto) 5 % (0-3)   


 


Basophils (%) (Auto) 1 % (0-3)   


 


Neutrophils # (Auto)


 3.4 x10^3uL


(1.8-7.7) 


 





 


Lymphocytes # (Auto)


 1.8 x10^3/uL


(1.0-4.8) 


 





 


Monocytes # (Auto)


 0.4 x10^3/uL


(0.0-1.1) 


 





 


Eosinophils # (Auto)


 0.3 x10^3/uL


(0.0-0.7) 


 





 


Basophils # (Auto)


 0.1 x10^3/uL


(0.0-0.2) 


 





 


Sodium Level


 143 mmol/L


(136-145) 


 





 


Potassium Level


 3.9 mmol/L


(3.5-5.1) 


 





 


Chloride Level


 104 mmol/L


() 


 





 


Carbon Dioxide Level


 27 mmol/L


(21-32) 


 





 


Anion Gap 12 (6-14)   


 


Blood Urea Nitrogen


 37 mg/dL


(7-20) 


 





 


Creatinine


 1.5 mg/dL


(0.6-1.0) 


 





 


Estimated GFR


(Cockcroft-Gault) 33.7 


 


 





 


BUN/Creatinine Ratio 25 (6-20)   


 


Glucose Level


 94 mg/dL


(70-99) 


 





 


Calcium Level


 9.3 mg/dL


(8.5-10.1) 


 





 


Magnesium Level


 1.5 mg/dL


(1.8-2.4) 


 





 


Total Bilirubin


 0.2 mg/dL


(0.2-1.0) 


 





 


Aspartate Amino Transf


(AST/SGOT) 15 U/L (15-37) 


 


 





 


Alanine Aminotransferase


(ALT/SGPT) 15 U/L (14-59) 


 


 





 


Alkaline Phosphatase


 61 U/L


() 


 





 


Troponin I Quantitative


 < 0.017 ng/mL


(0.000-0.055) < 0.017 ng/mL


(0.000-0.055) < 0.017 ng/mL


(0.000-0.055)


 


NT-Pro-B-Type Natriuretic


Peptide 249 pg/mL


(0-449) 


 





 


Total Protein


 7.6 g/dL


(6.4-8.2) 


 





 


Albumin


 3.2 g/dL


(3.4-5.0) 


 





 


Albumin/Globulin Ratio 0.7 (1.0-1.7)   


 


Lipase


 604 U/L


() 


 














VTE Prophylaxis Ordered


VTE Prophylaxis Devices:  Yes


VTE Pharmacological Prophylaxi:  Yes





Assessment/Plan


Assessment/Plan


Atypical chest pain most likely related to anxiety and stressors at home and 

had a recent left heart catheterization less than 6 months ago with no 

significant coronary artery disease patent LAD and RCA stents and no 

significant pulmonary hypertension


Anxiety disorder currently on SSRIs


History of GERD


History of coronary disease status post PCI and stenting as above


Permanent pacemaker in place secondary to sick sinus syndrome


Morbid obesity with a BMI of 45


Diabetes mellitus type 2 insulin requiring


Essemtial hypertension





Plan:


will consult cardiology


will resume home medications. 


further recommendations based on clinical  course. 


DVt prophylaxis: ALICIA Barreto MD Feb 27, 2019 16:34

## 2019-02-27 NOTE — PDOC2
CARDIAC CONSULT


DATE OF CONSULT


Date of Consult


DATE: 2/27/19 


TIME: 10:09





REASON FOR CONSULT


Reason for Consult:


Chest pain





REFERRING PHYSICIAN


Referring Physician:


Cindy





SOURCE


Source:  Chart review, Patient





HISTORY OF PRESENT ILLNESS


HISTORY OF PRESENT ILLNESS


This is a pleasant 80 yo female admitted for complains of chest pain.  Reports 

that yesterday she felt like there was a band across her lower ribcage region 

and has been feeling anxious more anxious in the last few days.  Reports no 

vomiting or nausea but slightly SOA.  No changes to her activity tolerance and 

I ambulated her in the hallway without any difficulty.  Reports that her 

anxiety is not controlled despite her med.  Report no palpitations or 

dizziness. Denies any jaw or arm discomfort.  No recent falls or injury.  No 

use of NSAIDs, or any pepcid or prilosec. She does have some family dynamic 

issues going on.  She currently lives with her son.





PAST MEDICAL HISTORY


Past Medical History


Cardiovascular:  CAD (with stenting ), HTN, MI, Hyperlipidemia, Other (SSS s/p 

pacemaker ([employer]), murmur )


Pulmonary:  COPD


CENTRAL NERVOUS SYSTEM:  Periperal neuropathy


GI:  GERD


Heme/Onc:  No pertinent hx


Hepatobiliary:  No pertinent hx


Psych:  No pertinent hx


Musculoskeletal:  Osteoarthritis


Infectious disease:  No pertinent hx


ENT:  No pertinent hx


Renal/:  No pertinent hx


Endocrine:  Diabetes, Hypothyroidism


Dermatology:  No pertinent hx








PAST SURGICAL HISTORY


Past Surgical History


Pacemaker, Appendectomy, Cholecystectomy, Tonsillectomy, Hysterectomy, PCI/stent





FAMILY HISTORY


Family History:  Coronary Artery Disease





SOCIAL HISTORY


Smoke:  No


ALCOHOL:  none


Drugs:  None


Lives:  with Family





CURRENT MEDICATIONS


CURRENT MEDICATIONS





Current Medications








 Medications


  (Trade)  Dose


 Ordered  Sig/Nga


 Route


 PRN Reason  Start Time


 Stop Time Status Last Admin


Dose Admin


 


 Aspirin


  ([facility]


 Aspirin)  324 mg  1X  ONCE


 PO


   2/26/19 18:30


 2/26/19 18:31 DC 2/26/19 19:25





 


 Lorazepam


  (Ativan)  1 mg  1X  ONCE


 IV


   2/26/19 18:30


 2/26/19 18:31 DC 2/26/19 19:26





 


 Nitroglycerin


  (Nitrostat)  0.4 mg  PRN Q5MIN  PRN


 SL


 CP RATING > 1/10  2/26/19 18:30


 2/27/19 18:29  2/26/19 19:29





 


 Sodium Chloride  1,000 ml @ 


 100 mls/hr  Q10H


 IV


   2/26/19 18:26


 2/27/19 04:25 DC 2/26/19 19:25














ALLERGIES


ALLERGIES:  


Coded Allergies:  


     Iodinated Contrast- Oral and IV Dye (Verified  Allergy, Intermediate, 10/31

/16)


     Tetracyclines (Verified  Allergy, Intermediate, 10/31/16)


     morphine (Verified  Allergy, Intermediate, 10/31/16)


     nitrofurantoin (Verified  Allergy, Intermediate, 10/31/16)





ROS


Review of System


14 point ROS evaluated with pertinent positives noted per HPI





PHYSICAL EXAM


General:  Alert, Oriented X3, Cooperative, No acute distress


HEENT:  Mucous membr. moist/pink


Lungs:  Normal air movement


Heart:  Regular rate (V paced), Normal S1, Normal S2, Other (2/6 MADONNA systolic 

murmur )


Abdomen:  Soft, No tenderness


Extremities:  No cyanosis, No edema


Skin:  No breakdown, No significant lesion


Neuro:  Normal speech, Sensation intact


Psych/Mental Status:  Mental status NL, Mood NL


MUSCULOSKELETAL:  Osteoarthritic changes both hands





VITALS


VITALS





Vital Signs








  Date Time  Temp Pulse Resp B/P (MAP) Pulse Ox O2 Delivery O2 Flow Rate FiO2


 


2/27/19 07:20 97.3 59 16 166/71 (102) 93 Room Air  





 97.3       











LABS


Lab:





Laboratory Tests








Test


 2/26/19


18:33 2/26/19


23:00 2/27/19


02:00


 


White Blood Count


 5.9 x10^3/uL


(4.0-11.0) 


 





 


Red Blood Count


 3.82 x10^6/uL


(3.50-5.40) 


 





 


Hemoglobin


 11.6 g/dL


(12.0-15.5) 


 





 


Hematocrit


 35.7 %


(36.0-47.0) 


 





 


Mean Corpuscular Volume 94 fL ()   


 


Mean Corpuscular Hemoglobin 30 pg (25-35)   


 


Mean Corpuscular Hemoglobin


Concent 32 g/dL


(31-37) 


 





 


Red Cell Distribution Width


 13.4 %


(11.5-14.5) 


 





 


Platelet Count


 221 x10^3/uL


(140-400) 


 





 


Neutrophils (%) (Auto) 57 % (31-73)   


 


Lymphocytes (%) (Auto) 30 % (24-48)   


 


Monocytes (%) (Auto) 7 % (0-9)   


 


Eosinophils (%) (Auto) 5 % (0-3)   


 


Basophils (%) (Auto) 1 % (0-3)   


 


Neutrophils # (Auto)


 3.4 x10^3uL


(1.8-7.7) 


 





 


Lymphocytes # (Auto)


 1.8 x10^3/uL


(1.0-4.8) 


 





 


Monocytes # (Auto)


 0.4 x10^3/uL


(0.0-1.1) 


 





 


Eosinophils # (Auto)


 0.3 x10^3/uL


(0.0-0.7) 


 





 


Basophils # (Auto)


 0.1 x10^3/uL


(0.0-0.2) 


 





 


Sodium Level


 143 mmol/L


(136-145) 


 





 


Potassium Level


 3.9 mmol/L


(3.5-5.1) 


 





 


Chloride Level


 104 mmol/L


() 


 





 


Carbon Dioxide Level


 27 mmol/L


(21-32) 


 





 


Anion Gap 12 (6-14)   


 


Blood Urea Nitrogen


 37 mg/dL


(7-20) 


 





 


Creatinine


 1.5 mg/dL


(0.6-1.0) 


 





 


Estimated GFR


(Cockcroft-Gault) 33.7 


 


 





 


BUN/Creatinine Ratio 25 (6-20)   


 


Glucose Level


 94 mg/dL


(70-99) 


 





 


Calcium Level


 9.3 mg/dL


(8.5-10.1) 


 





 


Magnesium Level


 1.5 mg/dL


(1.8-2.4) 


 





 


Total Bilirubin


 0.2 mg/dL


(0.2-1.0) 


 





 


Aspartate Amino Transf


(AST/SGOT) 15 U/L (15-37) 


 


 





 


Alanine Aminotransferase


(ALT/SGPT) 15 U/L (14-59) 


 


 





 


Alkaline Phosphatase


 61 U/L


() 


 





 


Troponin I Quantitative


 < 0.017 ng/mL


(0.000-0.055) < 0.017 ng/mL


(0.000-0.055) < 0.017 ng/mL


(0.000-0.055)


 


NT-Pro-B-Type Natriuretic


Peptide 249 pg/mL


(0-449) 


 





 


Total Protein


 7.6 g/dL


(6.4-8.2) 


 





 


Albumin


 3.2 g/dL


(3.4-5.0) 


 





 


Albumin/Globulin Ratio 0.7 (1.0-1.7)   


 


Lipase


 604 U/L


() 


 














ECHOCARDIOGRAM


ECHOCARDIOGRAM





<Conclusion>


The left ventricular systolic function is normal and the ejection fraction is 

within normal range. The Ejection Fraction is 55-60%.


Suboptimal images. Grossly normal wall motion.


Probable mild aortic stenosis. 


Doppler and Color Flow revealed trace tricuspid regurgitation. RVSP of 26 mm Hg.


There is a pacemaker lead in the RV/RA.





DATE:     10/17/18  0948





HEART CATH


HEART CATH





CORONARY ANGIOGRAPHY: 


LM is a moderate to large caliber vessel with normal angiographic appearance. 


LAD is a moderate caliber vessel with patent mid stents. 


D1 is a small caliber vessel with an ostial 70% stenosis. There is an 

epicardial collateral to the distal segment of this vessel. 


LCx is a small to moderate caliber non-dominant vessel with mild luminal 

irregularities. 


RCA is a large caliber dominant vessel with patent proximal and mid stents and 

mild luminal irregularities. 


RPDA and RPL are small to moderate caliber vessels with normal angiographic 

appearance.





Conclusion


1. No significant coronary artery disease, patent LAD and RCA stents. 


2. No significant pulmonary HTN. 


3. No clear cardiac source of exertional dyspnea noted. 





Recommendations


Aggressive Medical Therapy





DATE:     10/09/17 1343





ASSESSMENT/PLAN


ASSESSMENT/PLAN


1. Atypical chest pain: due to GI and anxiety. trops nml. EKG paced rhythm no 

acute changes. Recent TTE and LHC as noted above. 


2. Uncontrolled anxiety: Defer to PCP. on home SSRI


3. GERD


4. CAD: past stents


5. SSS/PPM in situ: [employer]. V paced


6. DM2/HLP


7. HTN: controlled





Recommendations


1. Interrogate for any contributing arrhythmia


2. Continue with secondary prevention measures. Continue home BP meds


3. Follow up in office as scheduled. 


4. Start on PPI











ИРИНА VARGAS Feb 27, 2019 10:32

## 2019-02-27 NOTE — EKG
[facility]

              [address] [place] [address]

Test Date:    2019               Test Time:    18:12:45

Pat Name:     CHRISTIANO BUSTILLO              Department:   

Patient ID:   [id number]           Room:         203 1

Gender:       F                        Technician:   

:          [id number]               Requested By: MRECED MARCANO

Order Number: [id number]           Reading MD:   Rafa Aceves

                                 Measurements

Intervals                              Axis          

Rate:         72                       P:            31

NV:           160                      QRS:          -42

QRSD:         170                      T:            53

QT:           406                                    

QTc:          446                                    

                           Interpretive Statements

ATRIAL SENSED VENTRICULAR PACED RHYTHM

Electronically Signed On 3-5-2019 11:03:13 CST by Rafa Aceves

## 2019-02-27 NOTE — NUR
SS following for discharge planning. Pt is from home and currently on room air. No discharge 
needs noted at this time. SS will continue to follow for pending discharge needs.

## 2019-02-28 NOTE — PDOC3
Discharge Summary


Visit Information


Date of Admission:  Feb 27, 2019


Date of Discharge:  Feb 28, 2019


Admitting Diagnosis Comment:


Atypical chest pain most likely related to anxiety and stressors at home and 

had a recent left heart catheterization less than 6 months ago with no 

significant coronary artery disease patent LAD and RCA stents and no 

significant pulmonary hypertension


Anxiety disorder currently on SSRIs


History of GERD


History of coronary disease status post PCI and stenting as above


Permanent pacemaker in place secondary to sick sinus syndrome


Morbid obesity with a BMI of 45


Diabetes mellitus type 2 insulin requiring


Essential hypertension


Final Diagnosis








Atypical chest pain most likely related to anxiety and stressors at home and 

had a recent left heart catheterization less than 6 months ago with no 

significant coronary artery disease patent LAD and RCA stents and no 

significant pulmonary hypertension


Anxiety disorder currently on SSRIs


History of GERD


History of coronary disease status post PCI and stenting as above


Permanent pacemaker in place secondary to sick sinus syndrome


Morbid obesity with a BMI of 45


Diabetes mellitus type 2 insulin requiring


Essential hypertension





Brief Hospital Course


Allergies





 Allergies








Coded Allergies Type Severity Reaction Last Updated Verified


 


  Iodinated Contrast- Oral and IV Dye Allergy Intermediate  10/31/16 Yes


 


  Tetracyclines Allergy Intermediate  10/31/16 Yes


 


  morphine Allergy Intermediate  10/31/16 Yes


 


  nitrofurantoin Allergy Intermediate  10/31/16 Yes








Vital Signs





Vital Signs








  Date Time  Temp Pulse Resp B/P (MAP) Pulse Ox O2 Delivery O2 Flow Rate FiO2


 


2/28/19 11:09 97.6 67 17 142/64 (90) 97 Room Air  





 97.6       








Lab Results





Laboratory Tests








Test


 2/26/19


18:33 2/26/19


23:00 2/27/19


02:00 2/27/19


20:00


 


White Blood Count


 5.9 x10^3/uL


(4.0-11.0) 


 


 





 


Red Blood Count


 3.82 x10^6/uL


(3.50-5.40) 


 


 





 


Hemoglobin


 11.6 g/dL


(12.0-15.5) 


 


 





 


Hematocrit


 35.7 %


(36.0-47.0) 


 


 





 


Mean Corpuscular Volume 94 fL ()    


 


Mean Corpuscular Hemoglobin 30 pg (25-35)    


 


Mean Corpuscular Hemoglobin


Concent 32 g/dL


(31-37) 


 


 





 


Red Cell Distribution Width


 13.4 %


(11.5-14.5) 


 


 





 


Platelet Count


 221 x10^3/uL


(140-400) 


 


 





 


Neutrophils (%) (Auto) 57 % (31-73)    


 


Lymphocytes (%) (Auto) 30 % (24-48)    


 


Monocytes (%) (Auto) 7 % (0-9)    


 


Eosinophils (%) (Auto) 5 % (0-3)    


 


Basophils (%) (Auto) 1 % (0-3)    


 


Neutrophils # (Auto)


 3.4 x10^3uL


(1.8-7.7) 


 


 





 


Lymphocytes # (Auto)


 1.8 x10^3/uL


(1.0-4.8) 


 


 





 


Monocytes # (Auto)


 0.4 x10^3/uL


(0.0-1.1) 


 


 





 


Eosinophils # (Auto)


 0.3 x10^3/uL


(0.0-0.7) 


 


 





 


Basophils # (Auto)


 0.1 x10^3/uL


(0.0-0.2) 


 


 





 


Sodium Level


 143 mmol/L


(136-145) 


 


 





 


Potassium Level


 3.9 mmol/L


(3.5-5.1) 


 


 





 


Chloride Level


 104 mmol/L


() 


 


 





 


Carbon Dioxide Level


 27 mmol/L


(21-32) 


 


 





 


Anion Gap 12 (6-14)    


 


Blood Urea Nitrogen


 37 mg/dL


(7-20) 


 


 





 


Creatinine


 1.5 mg/dL


(0.6-1.0) 


 


 





 


Estimated GFR


(Cockcroft-Gault) 33.7 


 


 


 





 


BUN/Creatinine Ratio 25 (6-20)    


 


Glucose Level


 94 mg/dL


(70-99) 


 


 





 


Calcium Level


 9.3 mg/dL


(8.5-10.1) 


 


 





 


Magnesium Level


 1.5 mg/dL


(1.8-2.4) 


 


 





 


Total Bilirubin


 0.2 mg/dL


(0.2-1.0) 


 


 





 


Aspartate Amino Transf


(AST/SGOT) 15 U/L (15-37) 


 


 


 





 


Alanine Aminotransferase


(ALT/SGPT) 15 U/L (14-59) 


 


 


 





 


Alkaline Phosphatase


 61 U/L


() 


 


 





 


Troponin I Quantitative


 < 0.017 ng/mL


(0.000-0.055) < 0.017 ng/mL


(0.000-0.055) < 0.017 ng/mL


(0.000-0.055) 





 


NT-Pro-B-Type Natriuretic


Peptide 249 pg/mL


(0-449) 


 


 





 


Total Protein


 7.6 g/dL


(6.4-8.2) 


 


 





 


Albumin


 3.2 g/dL


(3.4-5.0) 


 


 





 


Albumin/Globulin Ratio 0.7 (1.0-1.7)    


 


Lipase


 604 U/L


() 


 


 





 


Urine Collection Type    Unknown 


 


Urine Color    Yellow 


 


Urine Clarity    Clear 


 


Urine pH    6.0 


 


Urine Specific Gravity    1.015 


 


Urine Protein


 


 


 


 Negative mg/dL


(NEG-TRACE)


 


Urine Glucose (UA)


 


 


 


 Negative mg/dL


(NEG)


 


Urine Ketones (Stick)


 


 


 


 Negative mg/dL


(NEG)


 


Urine Blood    Negative (NEG) 


 


Urine Nitrite    Negative (NEG) 


 


Urine Bilirubin    Negative (NEG) 


 


Urine Urobilinogen Dipstick


 


 


 


 0.2 mg/dL (0.2


mg/dL)


 


Urine Leukocyte Esterase    Large (NEG) 


 


Urine RBC


 


 


 


 Rare /HPF


(0-2)


 


Urine WBC


 


 


 


 20-40 /HPF


(0-4)


 


Urine Squamous Epithelial


Cells 


 


 


 Few /LPF 





 


Urine Renal Epithelial Cells    Few /LPF 


 


Urine Bacteria


 


 


 


 Mod /HPF


(0-FEW)








Laboratory Tests








Test


 2/27/19


20:00


 


Urine Collection Type Unknown 


 


Urine Color Yellow 


 


Urine Clarity Clear 


 


Urine pH 6.0 


 


Urine Specific Gravity 1.015 


 


Urine Protein


 Negative mg/dL


(NEG-TRACE)


 


Urine Glucose (UA)


 Negative mg/dL


(NEG)


 


Urine Ketones (Stick)


 Negative mg/dL


(NEG)


 


Urine Blood Negative (NEG) 


 


Urine Nitrite Negative (NEG) 


 


Urine Bilirubin Negative (NEG) 


 


Urine Urobilinogen Dipstick


 0.2 mg/dL (0.2


mg/dL)


 


Urine Leukocyte Esterase Large (NEG) 


 


Urine RBC


 Rare /HPF


(0-2)


 


Urine WBC


 20-40 /HPF


(0-4)


 


Urine Squamous Epithelial


Cells Few /LPF 





 


Urine Renal Epithelial Cells Few /LPF 


 


Urine Bacteria


 Mod /HPF


(0-FEW)








Brief Hospital Course


Ms. High  is a 79 old female who presented with the above-mentioned complaint 

of chest discomfort. The patient was seen in consultation by cardiology who did 

not find concerning signs or symptoms for ischemia. The patient is dealing with 

the family situation that prompts this episodes. Reassurance has been provided. 

Patient had a recent coronary angiographically recent transthoracic 

echocardiogram reason why she did not require further investigation. She had 

her device interrogated with no arrhythmias noted. She was encouraged to follow-

up in the outpatient setting with her primary cardiologist and with her primary 

care physician. No changes were made to her home medications either. Signs and 

symptoms of alarm were discussed prior to discharge all of her concerns were 

addressed to the best of my abilities next





Physical exam:





Gen.: morbidly obese in no apparent distress


Head: Normal shape atraumatic


Eyes: Pupils equal reactive to light and accommodation, normal conjunctivae and 

lids


Ears: Normal shape


Nose: Normal shape no trauma


Mouth: No exudates of the back of throat no thrush no lesions


Neck: Supple no JVD no carotid bruit or lymphadenopathy no thyromegaly


Chest: distnat breath sounds due to body habitus Lungs clear to auscultation 

with good inspiratory effort no crackles rales or rhonchi


Cardiovascular: distant cardiac sounds due to body habitus.S1-S2 regular rhythm 

no murmurs gallops or rubs





Discharge Information


Condition at Discharge:  Improved


Follow Up:  Weeks


Disposition/Orders:  D/C to Home


Scheduled


Acetaminophen (Acetaminophen) 500 Mg Tablet, 2 TAB PO BID, #60 Ref 1 (Reported)


   Entered as Reported by: Virginia Duran on 4/9/18 2110


   Last Action: Converted on 2/27/19 1846 by REMA SHAHID


Amlodipine Besylate (Norvasc) 10 Mg Tablet, 1 TAB PO HS, #30 Ref 5 (Reported)


   Entered as Reported by: MARTY HER on 7/6/15 1626


   Last Action: Continued on 2/27/19 1846 by REMA SHAHID


Aspirin (Aspir 81) 81 Mg Tablet.dr, 1 TAB PO BID, #30 Ref 5 (Reported)


   Entered as Reported by: MARTY HER on 7/6/15 1626


   Last Action: Continued on 2/27/19 1846 by REMA SHAHID


Ezetimibe (Zetia) 10 Mg Tablet, 1 TAB PO DAILY for f, #30 Ref 5 (Reported)


   Entered as Reported by: REMA SHAHID on 2/27/19 1717


   Last Taken: Unknown Dose on Unknown Date & Time     Last Action: Continued 

on 2/27/19 1846 by REMA SHAHID


Fexofenadine Hcl (Allegra Allergy) 60 Mg Tablet, 60 MG PO DAILY, (Reported)


   Entered as Reported by: MARTY HER on 7/6/15 1626


   Last Action: Converted on 2/27/19 1846 by REMA SHAHID


Gabapentin (Gabapentin **) 300 Mg Capsule, 300 MG PO TID for NEUROGENIC PAIN, (

Reported)


   Entered as Reported by: REMA SHAHID on 2/27/19 1717


   Last Taken: Unknown Dose on Unknown Date & Time     Last Action: Continued 

on 2/27/19 1846 by REMA SHAHID


Gemfibrozil (Lopid) 600 Mg Tablet, 1 TAB PO BID for high cholestrol , #60 Ref 5 

(Reported)


   Entered as Reported by: REMA SHAHID on 2/27/19 1717


   Last Taken: Unknown Dose on Unknown Date & Time     Last Action: Continued 

on 2/27/19 1846 by REMA SHAHID


Glimepiride (Glimepiride) 2 Mg Tablet, 2 MG PO BID, (Reported)


   Entered as Reported by: BYRON PITTS on 10/9/17 0732


   Last Action: Continued on 2/27/19 1846 by REMA SHAHID


Hydrochlorothiazide (Hydrochlorothiazide Tablet  **) 25 Mg Tablet, 1 TAB PO 

DAILY, #30 Ref 5 (Reported)


   Entered as Reported by: MARTY HER on 7/6/15 1626


   Last Action: Continued on 2/27/19 1846 by REMA SHAHID


Hydrochlorothiazide (Hydrochlorothiazide Tablet) 12.5 Mg Tablet, 25 MG PO DAILY 

for DIURETIC, Ref 0 (Reported)


   Entered as Reported by: REMA SHAHID on 2/27/19 1717


   Last Taken: Unknown Dose on Unknown Date & Time     Last Action: New Order 

on 2/27/19 1717 by REMA SHAHID


Ipratropium/Albuterol Sulfate (Combivent Respimat Inhal) 4 Gm Aer.w.adap, 2 INH 

IH QID, (Reported)


   Entered as Reported by: MARTY HER on 7/6/15 1626


   Last Action: Converted on 2/27/19 1846 by REMA SHAHID


Iron (Iron) 18 Mg Tablet, 65 MG PO BID, (Reported)


   Entered as Reported by: MARTY HER on 7/6/15 1626


   Last Action: Converted on 2/27/19 1846 by REMA SHAHID


Levothyroxine Sodium (Levothyroxine Sodium) 112 Mcg Tablet, 112 MCG PO DAILYAC 

for THYROID SUPPLEMENT, #30 Ref 0 (Reported)


   Entered as Reported by: BYRON PITTS on 10/9/17 0732


   Last Action: Continued on 2/27/19 1846 by REMA SHAHID


Lisinopril (Lisinopril) 20 Mg Tablet, 1 TAB PO DAILY for HTN, #30 Ref 5 (

Reported)


   Entered as Reported by: REMA SHAHID on 2/27/19 1717


   Last Taken: Unknown Dose on Unknown Date & Time     Last Action: Continued 

on 2/27/19 1846 by REMA SHAHID


Meloxicam (Meloxicam) 7.5 Mg Tablet, 1 TAB PO DAILY for pain, #30 Ref 2 (

Reported)


   Entered as Reported by: REMA SHAHID on 2/27/19 1717


   Last Taken: Unknown Dose on Unknown Date & Time     Last Action: Converted 

on 2/27/19 1846 by REMA SHAHID


Metoprolol Succinate (Metoprolol Succinate ( Xl )) 100 Mg Tab.er.24h, 100 MG PO 

DAILY, #30 Ref 0


   Prescribed by: RODRÍGUEZ FENG MD on 11/2/16 1131


   Last Action: Continued on 2/27/19 1846 by ERMA SHAHID


Nitroglycerin (Nitrostat) 0.4 Mg Tab.subl, 1 TAB SL UD, #100 Ref 3 (Reported)


   Entered as Reported by: MARTY HER on 7/6/15 1626


   Last Action: Reviewed on 2/27/19 1652 by REMA SHAHID


[place]-3S/Dha/Epa/Fish Oil (Fish Oil 1,000 mg Softgel) 1 Each Capsule, 4 EACH PO 

BID for multivitamin, (Reported)


   Entered as Reported by: REMA SHAHID on 2/27/19 1717


   Last Taken: Unknown Dose on Unknown Date & Time     Last Action: Converted 

on 2/27/19 1846 by REMA SHAHID


Pioglitazone Hcl (Actos) 15 Mg Tablet, 1 TAB PO DAILY for f, #30 Ref 5 (Reported

)


   Entered as Reported by: REMA SHAHID on 2/27/19 1717


   Last Taken: Unknown Dose on Unknown Date & Time     Last Action: Converted 

on 2/27/19 1846 by REMA SHAHID


Rosuvastatin Calcium (Crestor) 10 Mg Tablet, 1 TAB PO QHS for high cholestrol , 

#30 Ref 5 (Reported)


   Entered as Reported by: REMA SHAHID on 2/27/19 1717


   Last Taken: Unknown Dose on Unknown Date & Time     Last Action: Converted 

on 2/27/19 1846 by REMA SHAHID


Sertraline Hcl (Sertraline Hcl) 100 Mg Tablet, 100 MG PO DAILY for ANTI-

DEPRESSANT, Ref 0 (Reported)


   Entered as Reported by: MARTY HER on 7/6/15 1626


   Last Action: Converted on 2/27/19 1846 by REMA SHAHID


Trazodone Hcl (Trazodone Hcl) 50 Mg Tablet, 50 MG PO HS, (Reported)


   Entered as Reported by: MARTY HER on 7/6/15 1626


   Last Action: Continued on 2/27/19 1846 by REMA SHAHID


Verapamil Hcl (Verapamil Er) 240 Mg Cap24h.pel, 240 MG PO DAILY, (Reported)


   Entered as Reported by: BYRON PITTS on 10/9/17 0732


   Last Action: Converted on 2/27/19 1846 by REMA SHAHID


[Pantoprazole] 40 MG TABLET.DR, 40 MG PO DAILYAC for GERD for 30 Days, #30


   Prescribed by: ALICIA CARR MD on 2/28/19 1129





Scheduled PRN


Albuterol Sulfate (Proair Hfa Inhaler) 8.5 Gm Hfa.aer.ad, 1 PUFF INH PRN Q6HRS 

PRN for SHORTNESS OF BREATH, Ref 0 (Reported)


   Entered as Reported by: MARTY HER on 7/6/15 1626


   Last Action: Reviewed on 2/27/19 1652 by REMA SHAHID





Miscellaneous Medications


Multivitamin (Multi Vitamin Daily) 1 Each Tablet, 1 EACH PO, (Reported)


   Entered as Reported by: MARTY HER on 7/6/15 1626


   Last Action: Reviewed on 2/27/19 1652 by REMA SHAHID





Discontinued Medications


Alprazolam (Alprazolam) 0.5 Mg Tablet, 1 TAB PO BID, #60 (Reported)


   Entered as Reported by: Virginia Duran on 4/9/18 2110


   Last Action: Discontinued on 2/27/19 1652 by REMA SHAHID


Diazepam (Valium) 5 Mg Tablet, 5 MG PO BID PRN for ANXIETY / AGITATION, (

Reported)


   Entered as Reported by: Virginia Duran on 4/9/18 2110


   Last Action: Discontinued on 2/27/19 1652 by REMA SHAHID


Gabapentin (Gabapentin **) 300 Mg Capsule, 1 CAP PO DAILY, #90 Ref 5 (Reported)


   Entered as Reported by: MARTY HER on 7/6/15 1626


   Last Action: Discontinued on 2/27/19 1652 by REMA SHAHID


Gabapentin (Gabapentin) 600 Mg Tablet, 600 MG PO HS, (Reported)


   Entered as Reported by: Virginia Duran on 4/9/18 2110


   Last Action: Discontinued on 2/27/19 1652 by REMA YOUNG


Loratadine (Loratadine) 10 Mg Tablet, 10 MG PO DAILY PRN for ALLERGIES, (

Reported)


   Entered as Reported by: BYRON PITTS on 10/9/17 0732


   Last Action: Discontinued on 2/27/19 1652 by REMA SHAHID


Omega-3 Fatty Acids (Fish Oil) 300 Mg Capsule, 300 MG PO BID, (Reported)


   Entered as Reported by: MARTY HER on 7/6/15 1626


   Last Action: Discontinued on 2/27/19 1652 by REMA SHAHID


Rosuvastatin Calcium (Crestor) 20 Mg Tablet, 20 MG PO HS for FOR CHOLESTEROL, #

30 Ref 0 (Reported)


   Entered as Reported by: BYRON PITTS on 10/9/17 0732


   Last Action: Discontinued on 2/27/19 1652 by ALICIA KASPER MD Feb 28, 2019 13:38

## 2019-02-28 NOTE — PDOC
ИРИНА VARGAS APRN 2/28/19 1344:


CARDIO Progress Notes


Date and Time


Date of Service


2/28/2019


Time of Evaluation


1040





Subjective


Subjective:  No Chest Pain, No shortness of breath, No Palpitations





Vitals


Vitals





Vital Signs








  Date Time  Temp Pulse Resp B/P (MAP) Pulse Ox O2 Delivery O2 Flow Rate FiO2


 


2/28/19 11:09 97.6 67 17 142/64 (90) 97 Room Air  





 97.6       








Weight


Weight [ ]





Input and Output


Intake and Output











Intake and Output 


 


 2/28/19





 06:59


 


Intake Total 800 ml


 


Output Total 1200 ml


 


Balance -400 ml


 


 


 


Intake Oral 800 ml


 


Output Urine Total 1200 ml


 


# Voids 1











Laboratory


Labs





Laboratory Tests








Test


 2/27/19


20:00


 


Urine Collection Type Unknown 


 


Urine Color Yellow 


 


Urine Clarity Clear 


 


Urine pH 6.0 


 


Urine Specific Gravity 1.015 


 


Urine Protein


 Negative mg/dL


(NEG-TRACE)


 


Urine Glucose (UA)


 Negative mg/dL


(NEG)


 


Urine Ketones (Stick)


 Negative mg/dL


(NEG)


 


Urine Blood Negative (NEG) 


 


Urine Nitrite Negative (NEG) 


 


Urine Bilirubin Negative (NEG) 


 


Urine Urobilinogen Dipstick


 0.2 mg/dL (0.2


mg/dL)


 


Urine Leukocyte Esterase Large (NEG) 


 


Urine RBC


 Rare /HPF


(0-2)


 


Urine WBC


 20-40 /HPF


(0-4)


 


Urine Squamous Epithelial


Cells Few /LPF 





 


Urine Renal Epithelial Cells Few /LPF 


 


Urine Bacteria


 Mod /HPF


(0-FEW)











Physical Exam


HEENT:  Neck Supple W Full Motion


Chest:  Symmetric


LUNGS:  Clear to Auscultation


Heart:  S1S2, RRR (paced)


Abdomen:  Soft N/T


Extremities:  No Calf Tenderness


Neurology:  alert, oriented, follow commands





Assessment


Assessment


1. Atypical chest pain: due to GI and anxiety. trops nml. EKG paced rhythm no 

acute changes. Recent TTE and LHC as noted above. 


2. Uncontrolled anxiety: Defer to PCP. on home SSRI


3. GERD: PPI


4. CAD: past stents


5. SSS/PPM in situ: [employer]. V paced, no significant arrhythmias. 

normal function, near JEANNINE


6. DM2/HLP


7. HTN: controlled





Recommendations


1. Will soon set generator change as an outpt 


2. Continue with secondary prevention measures. Continue home BP meds


3. Follow up in office as scheduled.





HYUN BELLA MD 2/28/19 2310:


CARDIO Progress Notes


Plan


Plan


Pt. seen and examined. Agree with above NP note. 


f/u on an outpt basis for gen change. 


non-cardiac chest pain. 


thanks. ok to dc today











ИРИНА VARGAS Feb 28, 2019 13:44


HYUN BELLA MD Feb 28, 2019 23:10

## 2019-09-19 ENCOUNTER — HOSPITAL ENCOUNTER (OUTPATIENT)
Dept: HOSPITAL 61 - CCL | Age: 79
Discharge: HOME | End: 2019-09-19
Attending: INTERNAL MEDICINE
Payer: MEDICARE

## 2019-09-19 VITALS
DIASTOLIC BLOOD PRESSURE: 70 MMHG | DIASTOLIC BLOOD PRESSURE: 70 MMHG | SYSTOLIC BLOOD PRESSURE: 178 MMHG | SYSTOLIC BLOOD PRESSURE: 178 MMHG | DIASTOLIC BLOOD PRESSURE: 70 MMHG | SYSTOLIC BLOOD PRESSURE: 178 MMHG

## 2019-09-19 VITALS — SYSTOLIC BLOOD PRESSURE: 151 MMHG | DIASTOLIC BLOOD PRESSURE: 64 MMHG

## 2019-09-19 VITALS — SYSTOLIC BLOOD PRESSURE: 158 MMHG | DIASTOLIC BLOOD PRESSURE: 53 MMHG

## 2019-09-19 VITALS — DIASTOLIC BLOOD PRESSURE: 50 MMHG | SYSTOLIC BLOOD PRESSURE: 165 MMHG

## 2019-09-19 VITALS — DIASTOLIC BLOOD PRESSURE: 85 MMHG | SYSTOLIC BLOOD PRESSURE: 178 MMHG

## 2019-09-19 VITALS — BODY MASS INDEX: 44.93 KG/M2 | WEIGHT: 238 LBS | HEIGHT: 61 IN

## 2019-09-19 VITALS — DIASTOLIC BLOOD PRESSURE: 63 MMHG | SYSTOLIC BLOOD PRESSURE: 173 MMHG

## 2019-09-19 VITALS — SYSTOLIC BLOOD PRESSURE: 176 MMHG | DIASTOLIC BLOOD PRESSURE: 59 MMHG

## 2019-09-19 VITALS — DIASTOLIC BLOOD PRESSURE: 60 MMHG | SYSTOLIC BLOOD PRESSURE: 170 MMHG

## 2019-09-19 DIAGNOSIS — I49.5: ICD-10-CM

## 2019-09-19 DIAGNOSIS — Z45.010: Primary | ICD-10-CM

## 2019-09-19 LAB
ANION GAP SERPL CALC-SCNC: 11 MMOL/L (ref 6–14)
BUN SERPL-MCNC: 37 MG/DL (ref 7–20)
CALCIUM SERPL-MCNC: 10.1 MG/DL (ref 8.5–10.1)
CHLORIDE SERPL-SCNC: 105 MMOL/L (ref 98–107)
CO2 SERPL-SCNC: 28 MMOL/L (ref 21–32)
CREAT SERPL-MCNC: 1.6 MG/DL (ref 0.6–1)
ERYTHROCYTE [DISTWIDTH] IN BLOOD BY AUTOMATED COUNT: 14.1 % (ref 11.5–14.5)
GFR SERPLBLD BASED ON 1.73 SQ M-ARVRAT: 31.3 ML/MIN
GLUCOSE SERPL-MCNC: 90 MG/DL (ref 70–99)
HCT VFR BLD CALC: 35.4 % (ref 36–47)
HGB BLD-MCNC: 11.5 G/DL (ref 12–15.5)
MCH RBC QN AUTO: 31 PG (ref 25–35)
MCHC RBC AUTO-ENTMCNC: 33 G/DL (ref 31–37)
MCV RBC AUTO: 95 FL (ref 79–100)
PLATELET # BLD AUTO: 198 X10^3/UL (ref 140–400)
POTASSIUM SERPL-SCNC: 3.8 MMOL/L (ref 3.5–5.1)
PROTHROMBIN TIME: 13.9 SEC (ref 11.7–14)
RBC # BLD AUTO: 3.73 X10^6/UL (ref 3.5–5.4)
SODIUM SERPL-SCNC: 144 MMOL/L (ref 136–145)
WBC # BLD AUTO: 4.9 X10^3/UL (ref 4–11)

## 2019-09-19 PROCEDURE — 99152 MOD SED SAME PHYS/QHP 5/>YRS: CPT

## 2019-09-19 PROCEDURE — 33228 REMV&REPLC PM GEN DUAL LEAD: CPT

## 2019-09-19 PROCEDURE — 33213 INSERT PULSE GEN DUAL LEADS: CPT

## 2019-09-19 PROCEDURE — C1785 PMKR, DUAL, RATE-RESP: HCPCS

## 2019-09-19 PROCEDURE — 85027 COMPLETE CBC AUTOMATED: CPT

## 2019-09-19 PROCEDURE — 99153 MOD SED SAME PHYS/QHP EA: CPT

## 2019-09-19 PROCEDURE — 80048 BASIC METABOLIC PNL TOTAL CA: CPT

## 2019-09-19 PROCEDURE — 85610 PROTHROMBIN TIME: CPT

## 2019-09-19 PROCEDURE — 36415 COLL VENOUS BLD VENIPUNCTURE: CPT

## 2019-09-19 NOTE — NUR
Discharge Note:



CHRISTIANO BUSTILLO [facility]



Discharge instructions and discharge home medications reviewed with Patient and a copy 
given. All questions have been answered and understanding verbalized. 



The following instructions and handouts were given: Post moderate sedation, PPM genorator 
Change.



Discontinued lines and drains: Right FA IV dc'd, tip intact.



Patient discharged to home with granddaughter via car.

## 2019-09-19 NOTE — CARD
MR#: [id number]

Account#: [id number]

Accession#: [id number]

Date of Study: 09/19/2019

Ordering Physician: HYUN BELLA, 

Referring Physician: HYUN BELLA, 

Tech: 





--------------- APPROVED REPORT --------------





HISTORY

The Patient is a 79 year-old female with a history of SS



PROCEDURES

FLUORO TIME: 0.0 MIN

DOSE:0.08 GYCM2

MODERATE SEDATION TIME: 76 MIN

Dual chamber pacemaker generator change



INDICATIONS

End of battery life



IMPLANTED DEVICES

After appropriate informed consent the patient was brought to the catheterization laboratory. The lef
t chest was prepped and draped in usual sterile fashion next 40 mL of 2% lidocaine local anesthesia w
as administered over the left chest wall site at the level of the previous pacemaker. Using a 15 blad
e scalpel an incision was made and cautery and blunt dissection was used to remove the previously imp
lanted [employer] battery. The battery was then removed and quickly replaced with a [employer] a
ssurity 2240 pacemaker with serial number [id number]. The leads were connected appropriately and adequa
te thresholds and sensitivities were obtained. The atrial lead is a [employer] lead with a ser
ial number of [id number], the ventricular lead is a [employer] lead with serial #[id number]



The incision was then closed in 3 layers. Due to significant morbid obesity there was difficulty oppo
sing the skin edges and ultimately were able to oppose the skin edges and Steri-Strips were placed.



CONCLUSION

Successful generator change for end-of-life in the setting of sick sinus syndrome.



Signed by : Hyun Bella, 

Electronically Approved : 09/19/2019 12:26:53

## 2019-10-14 ENCOUNTER — HOSPITAL ENCOUNTER (OUTPATIENT)
Dept: HOSPITAL 61 - KCIC | Age: 79
Discharge: HOME | End: 2019-10-14
Attending: FAMILY MEDICINE
Payer: MEDICARE

## 2019-10-14 DIAGNOSIS — M25.512: Primary | ICD-10-CM

## 2019-10-14 PROCEDURE — 77002 NEEDLE LOCALIZATION BY XRAY: CPT

## 2019-10-14 PROCEDURE — 23350 INJECTION FOR SHOULDER X-RAY: CPT

## 2019-10-14 PROCEDURE — 73201 CT UPPER EXTREMITY W/DYE: CPT

## 2019-10-14 PROCEDURE — 73040 CONTRAST X-RAY OF SHOULDER: CPT

## 2019-10-14 NOTE — [facility]
STUDY: CT arthrogram of the left shoulder

 

INDICATION: Left shoulder pain. Decreased range of motion.

 

COMPARISON: No prior cross-sectional imaging of the left shoulder is 

available for comparison.

 

TECHNIQUE: Axial CT imaging of the left shoulder performed after the 

intra-articular injection of contrast. The injection portion of the 

procedure is detailed separately. Coronal and sagittal reformats were 

obtained.

 

One or more of the following individualized dose reduction techniques were

utilized for this examination:  

 

1. Automated exposure control

2. Adjustment of the mA and/or kV according to patient size

3. Use of iterative reconstruction technique.

 

FINDINGS:

 

Sequela of relatively recently placed left chest wall pacer device.

 

AC joint: Minimal arthrosis. No injected contrast is present within the 

subacromial subdeltoid bursa.

 

Rotator cuff: Thin articular sided tear of the supraspinatus anterior 

fibers at the footprint as evidenced by a small amount of injected 

contrast extending into the supraspinatus tendon substance. This tear 

defect measures 2 mm AP by approximately 4 mm mediolateral, reference 

images 9 series 603 and image 10 series 602. Interstitial extension of 

tearing with a small amount of contrast propagating medially by up to 1.3 

cm, image 11 series 602. Contrast approaches but does not extend through 

the bursal sided fibers and there is collective involvement of 

approximately 80% tendon thickness. No articular sided tear of the 

infraspinatus, teres minor or subscapularis.

 

Rotator cuff muscular bulk is maintained.

 

Long head biceps tendon: Intact.

 

Labrum: Areas of labral blunting noted such as mid posterior to 

posterior/inferior.

 

Cartilage: No full-thickness chondral defect identified.

 

Bones: No acute fracture. Degenerative cystic change at the greater 

tuberosity subjacent to the supraspinatus insertion, image 7 series 603.

 

Miscellaneous: No axillary adenopathy. The visualized lungs are 

unremarkable.

 

IMPRESSION:

1.Small articular sided tear of the anterior supraspinatus at the 

footprint with the tear defect measuring approximately 2 mm AP by 4 mm 

mediolateral. Interstitial extension of tearing propagating medially by up

to 1.3 cm. Though the areas of interstitial extension are thin and 

somewhat serpiginous, the affected area of the tendon collectively 

involves approximately 80% of the tendon thickness.

2. Intact long head biceps tendon. Areas of labral blunting but without 

displaced tear. No full-thickness chondral defect.

 

 

 

Electronically signed by: MICHAEL BAEZA MD (10/14/2019 4:35 PM) 

[facility]

## 2019-10-16 NOTE — [facility]
Study: Fluoroscopically guided arthrogram of the left shoulder joint for 

CT

 

Indication: Left shoulder pain with limited range of motion. 

 

Contrast: Approximately 12 cc Omnipaque 300

 

Technique:

 

A timeout was performed prior to beginning the procedure in order to 

confirm patient identity and laterality of the injection. The risks, 

benefits and alternatives of the procedure were discussed.

 

Utilizing sterile technique, fluoroscopic guidance and local anesthesia 

with 1% lidocaine, the left shoulder joint was accessed utilizing a 

22-gauge, 3.5" spinal needle. Confirmation of needle position was obtained

with a small amount of radiopaque contrast. Subsequently, 12 cc of mixture

containing 15 cc Omnipaque 300 and 5 cc 1% lidocaine was injected. There 

were no immediate post procedure complications.

 

Fluoroscopy time: 38 seconds

Number of images obtained: 1

 

Impression:

 

Technically successful fluoroscopic guided arthrogram of the left shoulder

joint without immediate postprocedure complication.

 

Electronically signed by: MICHAEL BAEZA MD (10/16/2019 8:40 AM) 

[id number]

## 2020-06-22 ENCOUNTER — HOSPITAL ENCOUNTER (OUTPATIENT)
Dept: HOSPITAL 61 - ECHO | Age: 80
Discharge: HOME | End: 2020-06-22
Attending: INTERNAL MEDICINE
Payer: MEDICARE

## 2020-06-22 DIAGNOSIS — I08.8: Primary | ICD-10-CM

## 2020-06-22 DIAGNOSIS — Z95.0: ICD-10-CM

## 2020-06-22 PROCEDURE — 93306 TTE W/DOPPLER COMPLETE: CPT

## 2020-06-22 NOTE — CARD
MR#: [id number]

Account#: [id number]

Accession#: [id number]

Date of Study: 06/22/2020

Ordering Physician: HYUN BELLA, 

Referring Physician: HYUN BELLA, 

Tech: Elvie Camara





--------------- APPROVED REPORT --------------





EXAM: Two-dimensional and M-mode echocardiogram with Doppler and color Doppler.



Other Information 

Quality : FairHR: 67bpm

Technically limited study due to  body habitus and COPD



INDICATION

COPD  

Pulmonary Hypertention 



Surgery/Intervention

Pacemaker: 



RISK FACTORS

Hypertension 

Hyperlipidemia

Diabetes



2D DIMENSIONS 

RVDd4.0 (2.9-3.5cm)Left Atrium(2D)3.9 (1.6-4.0cm)

IVSd1.1 (0.7-1.1cm)Aortic Root(2D)2.7 (2.0-3.7cm)

LVDd4.7 (3.9-5.9cm)LVOT Diameter1.9 (1.8-2.4cm)

PWd1.0 (0.7-1.1cm)LVDs2.9 (2.5-4.0cm)

FS (%) 38.3 %SV71.4 ml

LVEF(%)68.5 (>50%)



Aortic Valve

AoV Peak Kyle.251.7cm/sAoV VTI67.3cm

AO Peak GR.25.3mmHgLVOT Peak Kyle.130.3cm/s

LVOT  VTI 35.52cmAO Mean GR.16mmHg

THOMAS (VMAX)1.21jf6NRU   (VTI)1.47cm2



Mitral Valve

MV E Odkfyxpa24.4cm/sMV DECEL [id number]

MV A Yekatzfd64.4cm/sMV E Mean Gr.3mmHg

MV IHQ45ijK/A  Ratio1.0

MVA (PHT)3.83cm2



TDI

E/Lateral E'14.5E/Medial E'12.3



Tricuspid Valve

TR P. [id number]/sRAP [id number]

TR Peak Gr.[id number]



 LEFT VENTRICLE 

The left ventricle is normal size. There is borderline to mild concentric left ventricular hypertroph
y. The left ventricular systolic function is normal. The Ejection Fraction is 55%. There is normal LV
 segmental wall motion. Transmitral Doppler flow pattern is Grade I-abnormal relaxation pattern.



 RIGHT VENTRICLE 

The right ventricle is normal size. There is normal right ventricular wall thickness. The right ventr
icular systolic function is normal. There is a pacemaker lead in the right ventricle.



 ATRIA 

The left atrium size is normal. The right atrium size is normal. The interatrial septum is intact wit
h no evidence for an atrial septal defect or patent foramen ovale as noted on 2-D or Doppler imaging.




 AORTIC VALVE 

The aortic valve is calcified but opens well. Doppler and Color Flow revealed trace aortic regurgitat
ion. Calculated aortic valve area is 1.33 cm2 with maximum pressure gradient of 34 mmHg and mean pres
sure gradient of 19 mmHg.



 MITRAL VALVE 

The mitral valve is normal in structure and function. There is no evidence of mitral valve prolapse. 
There is no mitral valve stenosis. Mitral valve mean gradient 2.5 mmHg. Doppler and Color Flow reveal
ed trace to mild mitral regurgitation.



 TRICUSPID VALVE 

The tricuspid valve is normal in structure and function. Doppler and Color Flow revealed mild tricusp
id regurgitation with an estimated PAP of 45 mmHg. There is no tricuspid valve stenosis.



 PULMONIC VALVE 

The pulmonic valve is not well visualized. Doppler and Color Flow revealed trace pulmonic valvular re
gurgitation.



 GREAT VESSELS 

The aortic root is normal in size. The ascending aorta is normal in size. The IVC is dilated.



 PERICARDIAL EFFUSION 

There is no evidence of significant pericardial effusion.



Critical Notification

Critical Value: No



<Conclusion>

The left ventricular systolic function is normal.

The Ejection Fraction is 55%.

There is normal LV segmental wall motion.

Transmitral Doppler flow pattern is Grade I-abnormal relaxation pattern.

There is a pacemaker lead in the right atrium and right ventricle.

Trace to mild mitral regurgitation.

Mild tricuspid regurgitation with an estimated PAP of 45 mmHg.

There is no evidence of significant pericardial effusion.



Signed by : Rafa Aceves, 

Electronically Approved : 06/22/2020 09:48:59

## 2020-11-07 ENCOUNTER — HOSPITAL ENCOUNTER (EMERGENCY)
Dept: HOSPITAL 61 - ER | Age: 80
LOS: 1 days | Discharge: HOME | End: 2020-11-08
Payer: MEDICARE

## 2020-11-07 ENCOUNTER — HOSPITAL ENCOUNTER (EMERGENCY)
Dept: HOSPITAL 61 - ER | Age: 80
Discharge: HOME | End: 2020-11-07
Payer: MEDICARE

## 2020-11-07 VITALS — HEIGHT: 61 IN | WEIGHT: 250.45 LBS | BODY MASS INDEX: 47.28 KG/M2

## 2020-11-07 VITALS — HEIGHT: 61 IN | WEIGHT: 249.12 LBS | BODY MASS INDEX: 47.03 KG/M2

## 2020-11-07 VITALS — DIASTOLIC BLOOD PRESSURE: 60 MMHG | SYSTOLIC BLOOD PRESSURE: 129 MMHG

## 2020-11-07 DIAGNOSIS — Z90.49: ICD-10-CM
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
## 2020-11-07 DIAGNOSIS — E03.9: ICD-10-CM

## 2020-11-07 DIAGNOSIS — Z98.890: ICD-10-CM

## 2020-11-07 DIAGNOSIS — R50.9: ICD-10-CM

## 2020-11-07 DIAGNOSIS — E11.9: ICD-10-CM

## 2020-11-07 DIAGNOSIS — M25.552: ICD-10-CM

## 2020-11-07 DIAGNOSIS — Z91.040: ICD-10-CM

## 2020-11-07 DIAGNOSIS — Z90.89: ICD-10-CM

## 2020-11-07 DIAGNOSIS — U07.1: Primary | ICD-10-CM

## 2020-11-07 DIAGNOSIS — Z88.6: ICD-10-CM

## 2020-11-07 DIAGNOSIS — E78.00: ICD-10-CM

## 2020-11-07 DIAGNOSIS — Z91.041: ICD-10-CM

## 2020-11-07 DIAGNOSIS — Z95.0: ICD-10-CM

## 2020-11-07 DIAGNOSIS — F41.9: ICD-10-CM

## 2020-11-07 DIAGNOSIS — R05: ICD-10-CM
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
## 2020-11-07 DIAGNOSIS — Z90.710: ICD-10-CM

## 2020-11-07 DIAGNOSIS — R51.9: ICD-10-CM

## 2020-11-07 DIAGNOSIS — Z88.8: ICD-10-CM

## 2020-11-07 DIAGNOSIS — M54.2: ICD-10-CM

## 2020-11-07 DIAGNOSIS — I25.10: ICD-10-CM

## 2020-11-07 DIAGNOSIS — Z95.5: ICD-10-CM

## 2020-11-07 DIAGNOSIS — I10: ICD-10-CM

## 2020-11-07 DIAGNOSIS — J44.9: ICD-10-CM
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
## 2020-11-07 DIAGNOSIS — F32.9: ICD-10-CM

## 2020-11-07 DIAGNOSIS — Z88.5: ICD-10-CM

## 2020-11-07 DIAGNOSIS — Z88.1: ICD-10-CM

## 2020-11-07 DIAGNOSIS — S49.92XA: Primary | ICD-10-CM

## 2020-11-07 DIAGNOSIS — R06.02: ICD-10-CM

## 2020-11-07 LAB
ALBUMIN SERPL-MCNC: 3.4 G/DL (ref 3.4–5)
ALBUMIN/GLOB SERPL: 0.9 {RATIO} (ref 1–1.7)
ALP SERPL-CCNC: 77 U/L (ref 46–116)
ALT SERPL-CCNC: 16 U/L (ref 14–59)
ANION GAP SERPL CALC-SCNC: 9 MMOL/L (ref 6–14)
APTT PPP: YELLOW S
AST SERPL-CCNC: 20 U/L (ref 15–37)
BACTERIA #/AREA URNS HPF: (no result) /HPF
BASOPHILS # BLD AUTO: 0 X10^3/UL (ref 0–0.2)
BASOPHILS NFR BLD: 1 % (ref 0–3)
BILIRUB SERPL-MCNC: 0.2 MG/DL (ref 0.2–1)
BILIRUB UR QL STRIP: NEGATIVE
BUN SERPL-MCNC: 30 MG/DL (ref 7–20)
BUN/CREAT SERPL: 18 (ref 6–20)
CALCIUM SERPL-MCNC: 9.1 MG/DL (ref 8.5–10.1)
CHLORIDE SERPL-SCNC: 103 MMOL/L (ref 98–107)
CK SERPL-CCNC: 27 U/L (ref 26–192)
CO2 SERPL-SCNC: 27 MMOL/L (ref 21–32)
CREAT SERPL-MCNC: 1.7 MG/DL (ref 0.6–1)
EOSINOPHIL NFR BLD: 0.1 X10^3/UL (ref 0–0.7)
EOSINOPHIL NFR BLD: 2 % (ref 0–3)
ERYTHROCYTE [DISTWIDTH] IN BLOOD BY AUTOMATED COUNT: 13.1 % (ref 11.5–14.5)
FIBRINOGEN PPP-MCNC: CLEAR MG/DL
GFR SERPLBLD BASED ON 1.73 SQ M-ARVRAT: 29.1 ML/MIN
GLUCOSE SERPL-MCNC: 81 MG/DL (ref 70–99)
HCT VFR BLD CALC: 30.6 % (ref 36–47)
HGB BLD-MCNC: 10.1 G/DL (ref 12–15.5)
HYALINE CASTS #/AREA URNS LPF: (no result) /HPF
LYMPHOCYTES # BLD: 0.6 X10^3/UL (ref 1–4.8)
LYMPHOCYTES NFR BLD AUTO: 17 % (ref 24–48)
MCH RBC QN AUTO: 31 PG (ref 25–35)
MCHC RBC AUTO-ENTMCNC: 33 G/DL (ref 31–37)
MCV RBC AUTO: 95 FL (ref 79–100)
MONO #: 0.4 X10^3/UL (ref 0–1.1)
MONOCYTES NFR BLD: 13 % (ref 0–9)
NEUT #: 2.3 X10^3/UL (ref 1.8–7.7)
NEUTROPHILS NFR BLD AUTO: 67 % (ref 31–73)
NITRITE UR QL STRIP: NEGATIVE
PH UR STRIP: 6.5 [PH]
PLATELET # BLD AUTO: 150 X10^3/UL (ref 140–400)
POTASSIUM SERPL-SCNC: 4.4 MMOL/L (ref 3.5–5.1)
PROT SERPL-MCNC: 7.3 G/DL (ref 6.4–8.2)
PROT UR STRIP-MCNC: NEGATIVE MG/DL
RBC # BLD AUTO: 3.21 X10^6/UL (ref 3.5–5.4)
RBC #/AREA URNS HPF: (no result) /HPF (ref 0–2)
SODIUM SERPL-SCNC: 139 MMOL/L (ref 136–145)
UROBILINOGEN UR-MCNC: 0.2 MG/DL
WBC # BLD AUTO: 3.5 X10^3/UL (ref 4–11)
WBC #/AREA URNS HPF: (no result) /HPF (ref 0–4)

## 2020-11-07 PROCEDURE — C9803 HOPD COVID-19 SPEC COLLECT: HCPCS

## 2020-11-07 PROCEDURE — U0003 INFECTIOUS AGENT DETECTION BY NUCLEIC ACID (DNA OR RNA); SEVERE ACUTE RESPIRATORY SYNDROME CORONAVIRUS 2 (SARS-COV-2) (CORONAVIRUS DISEASE [COVID-19]), AMPLIFIED PROBE TECHNIQUE, MAKING USE OF HIGH THROUGHPUT TECHNOLOGIES AS DESCRIBED BY CMS-2020-01-R: HCPCS

## 2020-11-07 PROCEDURE — 73060 X-RAY EXAM OF HUMERUS: CPT

## 2020-11-07 PROCEDURE — 87086 URINE CULTURE/COLONY COUNT: CPT

## 2020-11-07 PROCEDURE — 85025 COMPLETE CBC W/AUTO DIFF WBC: CPT

## 2020-11-07 PROCEDURE — 70450 CT HEAD/BRAIN W/O DYE: CPT

## 2020-11-07 PROCEDURE — 72131 CT LUMBAR SPINE W/O DYE: CPT

## 2020-11-07 PROCEDURE — 71045 X-RAY EXAM CHEST 1 VIEW: CPT

## 2020-11-07 PROCEDURE — 36415 COLL VENOUS BLD VENIPUNCTURE: CPT

## 2020-11-07 PROCEDURE — 83605 ASSAY OF LACTIC ACID: CPT

## 2020-11-07 PROCEDURE — 96374 THER/PROPH/DIAG INJ IV PUSH: CPT

## 2020-11-07 PROCEDURE — 73030 X-RAY EXAM OF SHOULDER: CPT

## 2020-11-07 PROCEDURE — 82550 ASSAY OF CK (CPK): CPT

## 2020-11-07 PROCEDURE — 73502 X-RAY EXAM HIP UNI 2-3 VIEWS: CPT

## 2020-11-07 PROCEDURE — 99284 EMERGENCY DEPT VISIT MOD MDM: CPT

## 2020-11-07 PROCEDURE — 80053 COMPREHEN METABOLIC PANEL: CPT

## 2020-11-07 PROCEDURE — 81001 URINALYSIS AUTO W/SCOPE: CPT

## 2020-11-07 PROCEDURE — 72125 CT NECK SPINE W/O DYE: CPT

## 2020-11-07 PROCEDURE — 87040 BLOOD CULTURE FOR BACTERIA: CPT

## 2020-11-07 NOTE — PHYS DOC
Past Medical History


Past Medical History:  Anemia, Anxiety, CAD, COPD, Depression, Diabetes-Type II,

High Cholesterol, Hypertension, Hypothyroid, Renal Disease


Past Surgical History:  Angioplasty, Appendectomy, Cholecystectomy, 

Hysterectomy, Pacemaker, Tonsillectomy


Additional Past Surgical Histo:  6 cardiac stents,


Smoking Status:  Never Smoker


Alcohol Use:  Rarely


Drug Use:  None





General Adult


EDM:


Chief Complaint:  SHORTNESS OF BREATH





HPI:


HPI:





Patient is a 80  year old female who presents with a 2-day history of fever 

cough myalgias headache.  Patient has a known sick contact with COVID-19.  

Patient denies any vomiting or diarrhea but has some mild shortness of breath.  

Patient has a dry cough and moderate to severe myalgias.  Symptoms are worse 

with activity.  Patient states her symptoms seem better with rest.





Review of Systems:


Review of Systems:


Constitutional:   Complains of fever


Eyes:   Denies change in visual acuity. []


HENT:   Complains of congestion and loss of taste and smell


Respiratory: Complains of cough and shortness of breath


Cardiovascular:   Denies chest pain or edema. [] 


GI:   Denies abdominal pain, nausea, vomiting, bloody stools or diarrhea. [] 


:  Denies dysuria. [] 


Musculoskeletal: Complains of myalgias


Integument:   Denies rash. [] 


Neurologic:   Complains of headache but no focal weakness or sensory changes. []

 


Endocrine:   Denies polyuria or polydipsia. [] 


Lymphatic:  Denies swollen glands. [] 


Psychiatric:  Denies depression or anxiety. []





Heart Score:


Risk Factors:


Risk Factors:  DM, Current or recent (<one month) smoker, HTN, HLP, family 

history of CAD, obesity.


Risk Scores:


Score 0 - 3:  2.5% MACE over next 6 weeks - Discharge Home


Score 4 - 6:  20.3% MACE over next 6 weeks - Admit for Clinical Observation


Score 7 - 10:  72.7% MACE over next 6 weeks - Early Invasive Strategies





Current Medications:





Current Medications








 Medications


  (Trade)  Dose


 Ordered  Sig/Nga  Start Time


 Stop Time Status Last Admin


Dose Admin


 


 Ketorolac


 Tromethamine


  (Toradol 15mg


 Vial)  15 mg  1X  ONCE  20 22:30


 20 22:31   














Allergies:


Allergies:





Allergies








Coded Allergies Type Severity Reaction Last Updated Verified


 


  Iodinated Contrast Media Allergy Intermediate FLU SYMPTOMS, NASAL CONGESTION 

10/14/19 Yes


 


  Tetracyclines Allergy Intermediate  10/31/16 Yes


 


  morphine Allergy Intermediate  10/31/16 Yes


 


  nitrofurantoin Allergy Intermediate  10/31/16 Yes


 


  tetrabenazine Allergy Intermediate  19 Yes











Physical Exam:


PE:





Constitutional: Well developed, well nourished, no acute distress, non-toxic 

appearance. []


HENT: Normocephalic, atraumatic, bilateral external ears normal, no trismus nose

 normal. []


Eyes: PERRLA, EOMI, conjunctiva normal, no discharge. [] 


Neck: Normal range of motion, no tenderness, supple, no stridor. [] No meningeal

 signs


Cardiovascular:Heart rate regular rhythm, peripheral pulses are intact


Lungs & Thorax:  Bilateral breath sounds clear no respiratory distress


Abdomen:, soft, no tenderness, no masses, no pulsatile masses. [] 


Skin: Warm, dry, no erythema, no rash. [] 


Back: No tenderness, no CVA tenderness. [] 


Extremities: No tenderness, no cyanosis, no clubbing, ROM intact, no edema. [] 


Neurologic: Alert and oriented X 3, normal motor function, normal sensory 

function, no focal deficits noted. []


Psychologic: Affect normal, judgement normal, mood normal. []





Current Patient Data:


Labs:





Laboratory Tests








Test


 20


22:00 20


22:30


 


White Blood Count 3.5 x10^3/uL  


 


Red Blood Count 3.21 x10^6/uL  


 


Hemoglobin 10.1 g/dL  


 


Hematocrit 30.6 %  


 


Mean Corpuscular Volume 95 fL  


 


Mean Corpuscular Hemoglobin 31 pg  


 


Mean Corpuscular Hemoglobin


Concent 33 g/dL 


 





 


Red Cell Distribution Width 13.1 %  


 


Platelet Count 150 x10^3/uL  


 


Neutrophils (%) (Auto) 67 %  


 


Lymphocytes (%) (Auto) 17 %  


 


Monocytes (%) (Auto) 13 %  


 


Eosinophils (%) (Auto) 2 %  


 


Basophils (%) (Auto) 1 %  


 


Neutrophils # (Auto) 2.3 x10^3/uL  


 


Lymphocytes # (Auto) 0.6 x10^3/uL  


 


Monocytes # (Auto) 0.4 x10^3/uL  


 


Eosinophils # (Auto) 0.1 x10^3/uL  


 


Basophils # (Auto) 0.0 x10^3/uL  


 


Sodium Level 139 mmol/L  


 


Potassium Level 4.4 mmol/L  


 


Chloride Level 103 mmol/L  


 


Carbon Dioxide Level 27 mmol/L  


 


Anion Gap 9  


 


Blood Urea Nitrogen 30 mg/dL  


 


Creatinine 1.7 mg/dL  


 


Estimated GFR


(Cockcroft-Gault) 29.1 


 





 


BUN/Creatinine Ratio 18  


 


Glucose Level 81 mg/dL  


 


Lactic Acid Level 0.7 mmol/L  


 


Calcium Level 9.1 mg/dL  


 


Total Bilirubin 0.2 mg/dL  


 


Aspartate Amino Transf


(AST/SGOT) 20 U/L 


 





 


Alanine Aminotransferase


(ALT/SGPT) 16 U/L 


 





 


Alkaline Phosphatase 77 U/L  


 


Creatine Kinase 27 U/L  


 


Total Protein 7.3 g/dL  


 


Albumin 3.4 g/dL  


 


Albumin/Globulin Ratio 0.9  


 


Urine Collection Type  Unknown 


 


Urine Color  Yellow 


 


Urine Clarity  Clear 


 


Urine pH  6.5 


 


Urine Specific Gravity  1.010 


 


Urine Protein  Negative mg/dL 


 


Urine Glucose (UA)  Negative mg/dL 


 


Urine Ketones (Stick)  Negative mg/dL 


 


Urine Blood  Negative 


 


Urine Nitrite  Negative 


 


Urine Bilirubin  Negative 


 


Urine Urobilinogen Dipstick  0.2 mg/dL 


 


Urine Leukocyte Esterase  Small 


 


Urine RBC  Occ /HPF 


 


Urine WBC  5-10 /HPF 


 


Urine Squamous Epithelial


Cells 


 Few /LPF 





 


Urine Bacteria  Few /HPF 


 


Urine Hyaline Casts


 


 Occasional


/HPF


 


Urine Mucus  Slight /LPF 








Current Medications








 Medications


  (Trade)  Dose


 Ordered  Sig/Nga


 Route


 PRN Reason  Start Time


 Stop Time Status Last Admin


Dose Admin


 


 Ketorolac


 Tromethamine


  (Toradol 15mg


 Vial)  15 mg  1X  ONCE


 IVP


   20 22:30


 20 22:31 DC 20 22:20











Vital Signs:





Vital Signs








  Date Time  Temp Pulse Resp B/P (MAP) Pulse Ox O2 Delivery O2 Flow Rate FiO2


 


20 19:45 98.9 74 18 142/57 (85) 96 Room Air  





 98.9       











EKG:


EKG:


[]





Radiology/Procedures:


Radiology/Procedures:


[][facility]


                    [address]  [place], [place] [id number] [phone]


                                        


                                 IMAGING REPORT





                                     Signed





PATIENT: CHRISTIANO BUSTILLO    ACCOUNT: [id number]     MRN#: [id number]


: 1940           LOCATION: ER              AGE: 80


SEX: F                    EXAM DT: 20         ACCESSION#: [id number]


STATUS: REG ER            ORD. PHYSICIAN: DEMETRIO ALONZO MD


REASON: COVID


PROCEDURE: PORTABLE CHEST 1V





Exam: Chest one view


 


INDICATION: Covid, shortness of breath


 


TECHNIQUE: Frontal view of the chest


 


Comparisons: 2020


 


FINDINGS:


Pacer with leads terminating the right atrium and ventricle.


 


Heart is mildly enlarged. Pulmonary vessels are within normal limits.


 


The lung and pleural spaces are clear.


 


IMPRESSION:


No acute pulmonary process.


 


Electronically signed by: Zay Sosa MD (2020 10:28 PM) [id number]














DICTATED and SIGNED BY:     ZAY SOSA MD


DATE:     20





Course & Med Decision Making:


Course & Med Decision Making


Pertinent Labs and Imaging studies reviewed. (See chart for details)





[] 80-year-old female presents with symptoms consistent with COVID-19.  Patient 

has a known  household contact that has COVID-19.  The patient is in no 

respiratory distress.  Patient's chest x-ray is negative.  Patient's laboratory 

assessment is at baseline.  On reassessment patient feels better and is resting 

comfortably.  At this point I do not feel that she meets criteria for admission 

to the hospital.  Discussed with patient return precautions.  Patient vocalized 

understanding will return if symptoms gets worse.





Dragon Disclaimer:


Dragon Disclaimer:


This electronic medical record was generated, in whole or in part, using a voice

 recognition dictation system.





Departure


Departure


Impression:  


   Primary Impression:  


   Fever


   Additional Impression:  


   Suspected COVID-19 virus infection


Disposition:  01 DC HOME SELF CARE/HOMELESS


Condition:  STABLE


Referrals:  


DANIELLA LINTON MD (PCP)


2-3 DAYS


Patient Instructions:  Viral Syndrome





Additional Instructions:  


EMERGENCY DEPARTMENT GENERAL DISCHARGE INSTRUCTIONS





THANK YOU for coming to [facility] Emergency Department (ED) 

today and 


trusting us with your care.  We trust that you had a positive experience in our 

Emergency 


Department. If you wish to speak to the department Management you can contact 

the department 


Director at [phone].








YOUR FOLLOW UP INSTRUCTIONS ARE AS FOLLOWS: 





Do you have a private doctor? If you do not have a private doctor, please ask 

for a resource 


list of physicians or clinics that may be able to assist you with follow up 

care.  





The Emergency Physician has interpreted your x-rays. The X-ray specialist will 

also review 


them. If there is a change in the findings you will be notified in 48 hours when

 at all 


possible. 





A lab test or lab culture may have been done, your results will be reviewed and 

you will be 


notified if you need a change in treatment. 








ADDITIONAL INSTRUCTIONS AND INFORMATION 





Your care today has been supervised by a physician who is specially trained in 

emergency 


care. Many problems require more than one evaluation for a complete diagnosis 

and treatment. 


We recommend that you schedule your follow up appointment as recommended to 

ensure complete 


treatment of your illness or injury.  If you are unable to obtain follow up care

 and 


continue to have a problem, or if your condition worsens we recommend that you 

return to the 


ED. 





We are not able to safely determine your condition over the phone nor are we 

able to give 


sound medical advice over the phone. For these safety reasons, if you call for 

medical 


advice we will ask you to come to the ED for further evaluation





If you have any questions regarding these discharge instructions please call the

 ED at [phone].





SAFETY INFORMATION





In the interest of safety, wellness, and injury prevention; we encourage you to 

wear your 


seatbelt, if you smoke; quit smoking, and we encourage your family to use 

protective helmet 


for bicycling and other sporting events that present an increased risk for head 

injury. 





IF YOUR SYMPTOMS WORSEN OR NEW SYMPTOMS DEVELOP, OR YOU HAVE CONCERNS ABOUT YOUR

 CONDITION; 


OR IF YOUR CONDITION WORSENS WHILE YOU ARE WAITING FOR YOUR FOLLOW UP 

APPOINTMENT;  EITHER  


CONTACT YOUR PRIMARY CARE DOCTOR, THE PHYSICIAN WHOSE NAME AND NUMBER YOU WERE 

GIVEN,  OR 


RETURN TO THE  ED IMMEDIATELY.








You have been tested for or diagnosed with COVID-19. It is an infection caused 

by a new type 


of coronavirus. COVID-19 will cause cold-like or mild flu symptoms in most. It 

can cause 


more severe symptoms like problems breathing in some.





There is no treatment for COVID-19. The body will clear the infection over time.

 Self-care 


will help to ease discomfort.





Steps to Take:


Self-Care


Rest as needed. Healthy habits may help you feel better. Steps include:





Choose healthy foods including fruits and vegetables. Drink water throughout the

 day.


Get plenty of sleep each night.


If you smoke, try to quit. It may ease breathing.


Avoid alcohol.


Keep Others Healthy


The virus can spread to others. Droplets are released every time you sneeze or 

cough. The 


droplets can get into the mouth, nose, or eyes of people near you and lead to 

infection. To 


lower the chances of spreading COVID-19 to others:





Stay at home until your doctor has said it is safe to leave. If you tested 

positive this 


will mean staying isolated until both of the following are true:





At least 7 days have passed since the start of illness.


You are free of fever for at least 72 hours without the use of medicine.


During this time:





 - Avoid public areas, events, or transportation. Do not return to work or 

school until your 


doctor has said it is safe to do so.


 - Call ahead if you need to go to a medical center. Let them know you may have 

COVID-19. It 


will help them guide you where to go. They may also ask you to wear a facemask 

when you come 


to the office.


 - If you call for emergency medical services, let them know you may have COVID-

19.


While at home:





 - Try to avoid close contact with others. Stay about 6 feet away.


 - If possible, spend most of your time in a separate room from others.


 - Use a face mask if you will be in close contact with others such as sharing a

 room or 


vehicle.


 - Have someone wipe down common surfaces in the home. Use household  

every day on 


areas like doorknobs, counters, or sinks.


 - Cough or sneeze into a tissue. Throw the tissue away right after use. If a 

tissue is not 


available, cough or sneeze into your elbow.


 - Wash your hands often. Wash them after sneezing or coughing. Use soap and 

water and wash 


for at least 20 seconds. Alcohol based hand  can be used if soap and 

water is not 


available.


 - Do not prepare food for others. Avoid sharing personal items like forks, 

spoons, or 


toothbrushes.


 - Avoid close contact with pets while you are sick. There is no evidence of the

 virus 


passing to pets. This is a safety step until more is known about this virus.


Isolation can be frustrating. Social interaction can help. Keep in touch with 

friends and 


family through phone and tech options. You can still interact with others in 

your home, just 


keep a safe distance of about 6 feet.





Follow-up:


Your doctors office will check in with you to see if there are any changes in 

your health. 


You may be asked to keep track of symptoms to share with them. They will also 

let you know 


when you are clear to be in public again.





Problems to Look Out For:


Contact your doctor if your recovery is not going as you expect. Get emergency 

care if you 


have problems such as:





 - Trouble breathing


 - Nonstop chest pain or pressure


 - Changes in awareness, confusion, or problems waking


 - Lips or face have bluish color


 - Worsening of symptoms


If you think you have an emergency, call for emergency medical services right 

away.





As taken from [facility]








Buy a pulse oximeter for home and return if oxygen level drops below 94%.  

Return if concerns or worsening symptoms.





COVID-19 Assessment:


COVID-19 Patient Risks:


Age 65 or older:  Yes


Sign of co-morbidity:  Yes


Exp to person + for COVID:  Yes


Fever:  Yes





PPE Use:


Full PPE with N95 mask or PAPR:  Yes











DEMETRIO ALONZO MD               2020 22:15

## 2020-11-07 NOTE — RAD
EXAM: HIP LEFT 2V WITH PELVIS.

 

HISTORY: Fall, trauma.

 

COMPARISON: 11/29/2017.

 

FINDINGS: No fractures are identified. The joint spaces and alignment of 

both hips appear maintained. There are moderate degenerative changes at 

the lumbosacral junction and pubic symphysis. Surgical clips project over 

the right groin.

 

IMPRESSION: 

1. No fracture.

 

Electronically signed by: BISHNU You MD (11/7/2020 6:38 AM) 

[facility]

## 2020-11-07 NOTE — PHYS DOC
Past Medical History


Past Medical History:  Anemia, Anxiety, CAD, COPD, Depression, Diabetes-Type II,

High Cholesterol, Hypertension, Hypothyroid, Renal Disease


 (DEMETRIO ALONZO MD)


Past Surgical History:  Angioplasty, Appendectomy, Cholecystectomy, 

Hysterectomy, Pacemaker, Tonsillectomy


Additional Past Surgical Histo:  6 cardiac stents,


 (DEMETRIO ALONZO MD)


Smoking Status:  Never Smoker


Alcohol Use:  Rarely


Drug Use:  None


 (DEMETRIO ALONZO MD)





General Adult


EDM:


Chief Complaint:  SHOULDER INJURY





HPI:


HPI:





Patient is a 80  year old female who arrives via EMS with a chief complaint of 

left shoulder pain.  Patient had a mechanical fall and landed on her left 

shoulder.  Patient also has neck pain.  Patient does not recall hitting her head

or having loss of consciousness.  Patient also has left hip pain.  Pain is 

currently 4 out of 10 worse with palpation and range of motion.  Patient 

declines pain medicine at this time.  Of note patient has a sick contact in her 

house with COVID-19 although she has isolated from him and has no current 

symptoms.


 (DEMETRIO ALONZO MD)





Review of Systems:


Review of Systems:


Constitutional:   Denies fever or chills. []


Eyes:   Denies change in visual acuity. []


HENT:   Denies nasal congestion or sore throat. [] 


Respiratory:   Denies cough or shortness of breath. [] 


Cardiovascular:   Denies chest pain or edema. [] 


GI:   Denies abdominal pain, nausea, vomiting, bloody stools or diarrhea. [] 


:  Denies dysuria. [] 


Musculoskeletal:   Complains of neck, back, left shoulder and left hip pain


Integument:   Denies rash. [] 


Neurologic:   Denies headache, focal weakness or sensory changes. [] 


Endocrine:   Denies polyuria or polydipsia. [] 


Lymphatic:  Denies swollen glands. [] 


Psychiatric:  Denies depression or anxiety. []


 (DEMETRIO ALONZO MD)





Heart Score:


Risk Factors:


Risk Factors:  DM, Current or recent (<one month) smoker, HTN, HLP, family 

history of CAD, obesity.


Risk Scores:


Score 0 - 3:  2.5% MACE over next 6 weeks - Discharge Home


Score 4 - 6:  20.3% MACE over next 6 weeks - Admit for Clinical Observation


Score 7 - 10:  72.7% MACE over next 6 weeks - Early Invasive Strategies


 (DEMETRIO ALONZO MD)





Allergies:


Allergies:





Allergies








Coded Allergies Type Severity Reaction Last Updated Verified


 


  Iodinated Contrast Media Allergy Intermediate FLU SYMPTOMS, NASAL CONGESTION 

10/14/19 Yes


 


  Tetracyclines Allergy Intermediate  10/31/16 Yes


 


  morphine Allergy Intermediate  10/31/16 Yes


 


  nitrofurantoin Allergy Intermediate  10/31/16 Yes


 


  tetrabenazine Allergy Intermediate  6/25/19 Yes








 (DEMETRIO ALONZO MD)





Physical Exam:


PE:





Constitutional: Well developed, well nourished, no acute distress, non-toxic 

appearance. []


HENT: Normocephalic, atraumatic, bilateral external ears normal, no trismus nose

 normal. []


Eyes: PERRLA, EOMI, conjunctiva normal, no discharge. [] 


Neck: Mild tender to palpate, more prominent on the left side


Cardiovascular:Heart rate regular rhythm, peripheral pulses are intact cap 

refill is brisk


Lungs & Thorax:  Bilateral breath sounds clear, no respiratory distress


Abdomen: , soft, no tenderness, no masses, no pulsatile masses. [] 


Skin: Warm, dry, no erythema, no rash. [] 


Back: Tender to palpate lumbar spine


Extremities: Tenderness to palpate left shoulder, limited range of motion due to

 pain.  Mild tenderness to palpate left hip, all extremities are neurovascular 

intact distally.


Neurologic: Alert and oriented X 3, normal motor function, normal sensory 

function, no focal deficits noted. []


Psychologic: Affect normal, judgement normal, mood normal. []


 (DEMETRIO ALONZO MD)





Current Patient Data:


Vital Signs:





Vital Signs








  Date Time  Temp Pulse Resp B/P (MAP) Pulse Ox O2 Delivery O2 Flow Rate FiO2


 


11/7/20 04:36 98.4 68 16 149/67 (94) 97 Room Air  





 98.4       








 (DEMETRIO ALONZO MD)





EKG:


EKG:


[]


 (DEMETRIO ALONZO MD)





Radiology/Procedures:


Radiology/Procedures:


[]


 (DEMETRIO ALONZO MD)





Course & Med Decision Making:


Course & Med Decision Making


Pertinent Labs and Imaging studies reviewed. (See chart for details)





[] Care will be signed out to Dr. Cantu with imaging and disposition pending.


 (DEMETRIO ALONZO MD)


Course & Med Decision Making


Assumed care of patient at checkout.  At checkout CT and x-rays were pending.  I

 have fully reviewed imaging and at this time there are no signs of bleeding or 

fracture.  Patient is feeling better would like to go home.  Patient's test 

results and vitals while in the ED were fully reviewed and discussed with the 

patient. Patient is stable and at this time does not need admission to the hosp

ital. We have discussed strict return precautions and the importance of 

following up with their Primary Care Physician. Patient stated understanding and

 was given an opportunity to ask any questions. Patient is in agreement with 

plan.


 (JANEEN CANTU MD)


Dragon Disclaimer:


Dragon Disclaimer:


This electronic medical record was generated, in whole or in part, using a voice

 recognition dictation system.


 (DEMETRIO ALONZO MD)





Departure


Departure


Impression:  


   Primary Impression:  


   Injury of left shoulder


   Additional Impression:  


   Fall


Disposition:  01 DC HOME SELF CARE/HOMELESS


Condition:  STABLE


Referrals:  


DANIELLA LINTON MD (PCP)


Patient Instructions:  Fall Prevention and Home Safety, Shoulder Pain











DEMETRIO ALONZO MD               Nov 7, 2020 05:06


JANEEN CANTU MD               Nov 7, 2020 07:17

## 2020-11-07 NOTE — RAD
EXAM:

1. CHEST ONE VIEW.

2. LEFT SHOULDER 3 VIEWS.

3. LEFT HUMERUS 2 VIEWS.

 

HISTORY: Fall, trauma.

 

COMPARISON: 06/24/2019.

 

FINDINGS: A left-sided pacemaker has its leads in the right atrium and 

right ventricle. There are no confluent infiltrates. There is no 

pneumothorax or pleural effusion. The heart is not enlarged. There are 

atherosclerotic calcifications of the aorta. 

 

No fractures are appreciated about the left shoulder or within the left 

humerus. Glenohumeral joint space is not well profiled but alignment is 

maintained. Acromioclavicular osteoarthritis is mild for patient age. The 

alignment of the elbow appears maintained.

 

IMPRESSION: 

1. No confluent infiltrates.

2. No fracture or malalignment.

 

Electronically signed by: BISHNU You MD (11/7/2020 6:36 AM) 

[facility]

## 2020-11-07 NOTE — RAD
EXAM: 

1. CT HEAD WITHOUT CONTRAST.

2. CT CERVICAL SPINE WITHOUT CONTRAST.

 

HISTORY: Fall, trauma.

 

TECHNIQUE: Computed tomography of the head and cervical spine was 

performed without intravenous contrast. One or more of the following 

individualized dose reduction techniques were utilized for this 

examination:  

1. Automated exposure control.  

2. Adjustment of the mA and/or kV according to patient size.  

3. Use of iterative reconstruction technique.

 

COMPARISON: None.

 

FINDINGS:  There is no intracranial hemorrhage. Hypoattenuation within the

white matter indicates moderate chronic microangiopathic change. 

Prominence of the lateral ventricles and hemispheric sulci indicates mild 

atrophy.

 

The visualized paranasal sinuses appear clear. There are changes of 

bilateral cataract surgery. The temporal bones are unremarkable. The 

calvarium reveals no suspicious lesions. There are atherosclerotic 

calcifications of the internal carotid arteries.

 

Reversal of the normal cervical lordosis is likely positional. There is 

slight grade 1 anterolisthesis at C4-5. The craniocervical junction is 

unremarkable. No fractures are identified. Degenerative disc disease is 

moderate to severe from C5 through C7 and moderate at C4-5. There is no 

prevertebral soft tissue swelling.

 

At C2-3, there is a small posterior disc bulge. Facet osteoarthritis is 

moderate on the left and mild on the right.

 

At C3-4, there is moderate right facet osteoarthritis. Right neural 

foraminal stenosis is mild.

 

At C4-5, there is a small posterior disc bulge. Facet osteoarthritis is 

moderate bilaterally. Uncovertebral osteoarthritis is mild on the left.

 

At C5-6, there is a small posterior disc-osteophyte complex. Uncovertebral

osteoarthritis is moderate on the right and mild on the left. Neural 

foraminal stenosis is moderate on the left.

 

At C6-7, there is a small posterior disc-osteophyte complex. Uncovertebral

osteoarthritis is moderate bilaterally. Neural foraminal stenosis is 

moderate on the right.

 

Pacemaker leads are partially visualized.

 

IMPRESSION:

1. No acute intracranial findings.

2. Mild atrophy and moderate chronic microangiopathic white matter change.

3. No cervical fracture or acute malalignment.

4. Moderate cervical degenerative changes as above.

 

Electronically signed by: BISHNU You MD (11/7/2020 6:18 AM) 

[facility]

## 2020-11-07 NOTE — RAD
EXAM: CT lumbar spine without contrast.

 

HISTORY: Fall, pain.

 

TECHNIQUE: CT of the lumbar spine was performed without intravenous 

contrast. One or more of the following individualized dose reduction 

techniques were utilized for this examination:  

1. Automated exposure control.  

2. Adjustment of the mA and/or kV according to patient size.  

3. Use of iterative reconstruction technique.

 

COMPARISON: None.

 

FINDINGS: Cholecystectomy clips are noted. There are moderate 

atherosclerotic calcifications. Sigmoid diverticulosis is partially 

visualized. A cyst in the right kidney measures at least 3.7 cm.

 

Sacroiliac osteoarthritis is moderate on the right and mild on the left. 

Lumbar alignment is maintained. Vertebral body heights are maintained, and

no fractures are identified. Osteopenia is at least moderate. Degenerative

disc disease is moderate at L4-5 and moderate to severe at L5-S1.

 

From L1 through L4, there are minimal posterior disc bulges. There is no 

stenosis.

 

At L4-5, there is a moderate posterior disc bulge. Facet osteoarthritis is

moderate to severe. Central canal stenosis is moderate to severe. Neural 

foraminal stenosis is moderate on the left and mild on the right.

 

L5-S1, there is moderate to severe facet osteoarthritis bilaterally. 

Neural foraminal stenosis is moderate to severe on the left and moderate 

on the right.

 

IMPRESSION:

1. No fracture.

2. Moderate degenerative changes inferiorly result in central canal 

stenosis that is moderate to severe at L4-5. Neural foraminal stenosis is 

up to moderate/severe on the left at L5-S1. MRI could further assess 

stenosis if there is persistent concern.

 

Electronically signed by: BISHNU You MD (11/7/2020 6:34 AM) 

[facility]

## 2020-11-07 NOTE — RAD
Exam: Chest one view

 

INDICATION: Covid, shortness of breath

 

TECHNIQUE: Frontal view of the chest

 

Comparisons: 11/7/2020

 

FINDINGS:

Pacer with leads terminating the right atrium and ventricle.

 

Heart is mildly enlarged. Pulmonary vessels are within normal limits.

 

The lung and pleural spaces are clear.

 

IMPRESSION:

No acute pulmonary process.

 

Electronically signed by: Zay Miles MD (11/7/2020 10:28 PM) [id number]

## 2020-11-07 NOTE — RAD
EXAM:

1. CHEST ONE VIEW.

2. LEFT SHOULDER 3 VIEWS.

3. LEFT HUMERUS 2 VIEWS.

 

HISTORY: Fall, trauma.

 

COMPARISON: 06/24/2019.

 

FINDINGS: A left-sided pacemaker has its leads in the right atrium and 

right ventricle. There are no confluent infiltrates. There is no 

pneumothorax or pleural effusion. The heart is not enlarged. There are 

atherosclerotic calcifications of the aorta. 

 

No fractures are appreciated about the left shoulder or within the left 

humerus. Glenohumeral joint space is not well profiled but alignment is 

maintained. Acromioclavicular osteoarthritis is mild for patient age. The 

alignment of the elbow appears maintained.

 

IMPRESSION: 

1. No confluent infiltrates.

2. No fracture or malalignment.

 

Electronically signed by: BISHNU You MD (11/7/2020 6:36 AM) 

[facility]

## 2020-11-07 NOTE — RAD
EXAM:

1. CHEST ONE VIEW.

2. LEFT SHOULDER 3 VIEWS.

3. LEFT HUMERUS 2 VIEWS.

 

HISTORY: Fall, trauma.

 

COMPARISON: 06/24/2019.

 

FINDINGS: A left-sided pacemaker has its leads in the right atrium and 

right ventricle. There are no confluent infiltrates. There is no 

pneumothorax or pleural effusion. The heart is not enlarged. There are 

atherosclerotic calcifications of the aorta. 

 

No fractures are appreciated about the left shoulder or within the left 

humerus. Glenohumeral joint space is not well profiled but alignment is 

maintained. Acromioclavicular osteoarthritis is mild for patient age. The 

alignment of the elbow appears maintained.

 

IMPRESSION: 

1. No confluent infiltrates.

2. No fracture or malalignment.

 

Electronically signed by: BISHNU You MD (11/7/2020 6:36 AM) 

[facility]

## 2020-11-08 VITALS
DIASTOLIC BLOOD PRESSURE: 60 MMHG | DIASTOLIC BLOOD PRESSURE: 60 MMHG | SYSTOLIC BLOOD PRESSURE: 128 MMHG | SYSTOLIC BLOOD PRESSURE: 128 MMHG

## 2020-11-10 NOTE — NUR
IP: Informed pt of positive COVID results and need to quarantine for 10-14 days pending 
symptoms. Pt verbalized understanding.

## 2021-01-06 ENCOUNTER — HOSPITAL ENCOUNTER (OUTPATIENT)
Dept: HOSPITAL 61 - KCIC | Age: 81
End: 2021-01-06
Attending: FAMILY MEDICINE
Payer: MEDICARE

## 2021-01-06 DIAGNOSIS — M77.31: ICD-10-CM
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
## 2021-01-06 DIAGNOSIS — S92.911A: Primary | ICD-10-CM
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
## 2021-01-06 DIAGNOSIS — M76.61: ICD-10-CM

## 2021-01-06 PROCEDURE — 73630 X-RAY EXAM OF FOOT: CPT

## 2021-01-06 NOTE — KCIC
EXAM: Right foot, 3 views.



HISTORY: Fifth digit pain.



COMPARISON: None.



FINDINGS: 3 views of the right foot are obtained. There is a minimally displaced fracture of the dist
al aspect of the fifth proximal phalanx. There is mild first metatarsal phalangeal joint spurring. Th
ere is a small plantar spur. There is enthesopathy at Achilles tendon insertion.



IMPRESSION: Minimally displaced fracture of the distal aspect of the fifth proximal phalanx.



Electronically signed by: Yareli Gomez MD (1/6/2021 4:07 PM) [id number]

## 2021-05-13 VITALS — DIASTOLIC BLOOD PRESSURE: 50 MMHG | SYSTOLIC BLOOD PRESSURE: 143 MMHG

## 2021-07-12 ENCOUNTER — HOSPITAL ENCOUNTER (OUTPATIENT)
Dept: HOSPITAL 61 - ECHO | Age: 81
End: 2021-07-12
Attending: INTERNAL MEDICINE
Payer: MEDICARE

## 2021-07-12 DIAGNOSIS — I08.0: Primary | ICD-10-CM

## 2021-07-12 DIAGNOSIS — I25.10: ICD-10-CM

## 2021-07-12 PROCEDURE — 93307 TTE W/O DOPPLER COMPLETE: CPT

## 2022-01-22 ENCOUNTER — HOSPITAL ENCOUNTER (INPATIENT)
Dept: HOSPITAL 61 - ER | Age: 82
LOS: 1 days | Discharge: HOME | DRG: 305 | End: 2022-01-23
Attending: INTERNAL MEDICINE | Admitting: INTERNAL MEDICINE
Payer: MEDICARE

## 2022-01-22 VITALS — DIASTOLIC BLOOD PRESSURE: 48 MMHG | SYSTOLIC BLOOD PRESSURE: 158 MMHG

## 2022-01-22 VITALS — SYSTOLIC BLOOD PRESSURE: 160 MMHG | DIASTOLIC BLOOD PRESSURE: 61 MMHG

## 2022-01-22 VITALS — SYSTOLIC BLOOD PRESSURE: 145 MMHG | DIASTOLIC BLOOD PRESSURE: 60 MMHG

## 2022-01-22 VITALS — HEIGHT: 61 IN | BODY MASS INDEX: 47.28 KG/M2 | WEIGHT: 250.45 LBS

## 2022-01-22 DIAGNOSIS — E78.5: ICD-10-CM

## 2022-01-22 DIAGNOSIS — F41.9: ICD-10-CM

## 2022-01-22 DIAGNOSIS — Z79.01: ICD-10-CM

## 2022-01-22 DIAGNOSIS — Z86.73: ICD-10-CM

## 2022-01-22 DIAGNOSIS — Z82.49: ICD-10-CM

## 2022-01-22 DIAGNOSIS — Z66: ICD-10-CM

## 2022-01-22 DIAGNOSIS — E03.9: ICD-10-CM

## 2022-01-22 DIAGNOSIS — Z95.0: ICD-10-CM

## 2022-01-22 DIAGNOSIS — I10: ICD-10-CM

## 2022-01-22 DIAGNOSIS — I16.0: Primary | ICD-10-CM

## 2022-01-22 DIAGNOSIS — I25.10: ICD-10-CM

## 2022-01-22 DIAGNOSIS — E11.9: ICD-10-CM

## 2022-01-22 DIAGNOSIS — Z95.5: ICD-10-CM

## 2022-01-22 DIAGNOSIS — K21.9: ICD-10-CM

## 2022-01-22 DIAGNOSIS — M19.90: ICD-10-CM

## 2022-01-22 DIAGNOSIS — Z79.899: ICD-10-CM

## 2022-01-22 DIAGNOSIS — Z79.82: ICD-10-CM

## 2022-01-22 DIAGNOSIS — Z20.822: ICD-10-CM

## 2022-01-22 DIAGNOSIS — G62.9: ICD-10-CM

## 2022-01-22 DIAGNOSIS — F32.A: ICD-10-CM

## 2022-01-22 DIAGNOSIS — J44.9: ICD-10-CM

## 2022-01-22 DIAGNOSIS — E78.00: ICD-10-CM

## 2022-01-22 DIAGNOSIS — E66.9: ICD-10-CM

## 2022-01-22 DIAGNOSIS — Z90.710: ICD-10-CM

## 2022-01-22 LAB
ALBUMIN SERPL-MCNC: 3.5 G/DL (ref 3.4–5)
ALBUMIN/GLOB SERPL: 0.8 {RATIO} (ref 1–1.7)
ALP SERPL-CCNC: 85 U/L (ref 46–116)
ALT SERPL-CCNC: 18 U/L (ref 14–59)
ANION GAP SERPL CALC-SCNC: 12 MMOL/L (ref 6–14)
APTT BLD: 26 SEC (ref 24–38)
AST SERPL-CCNC: 14 U/L (ref 15–37)
BASOPHILS # BLD AUTO: 0.1 X10^3/UL (ref 0–0.2)
BASOPHILS NFR BLD: 1 % (ref 0–3)
BILIRUB SERPL-MCNC: 0.2 MG/DL (ref 0.2–1)
BUN SERPL-MCNC: 32 MG/DL (ref 7–20)
BUN/CREAT SERPL: 20 (ref 6–20)
CALCIUM SERPL-MCNC: 8.8 MG/DL (ref 8.5–10.1)
CHLORIDE SERPL-SCNC: 103 MMOL/L (ref 98–107)
CO2 SERPL-SCNC: 28 MMOL/L (ref 21–32)
CREAT SERPL-MCNC: 1.6 MG/DL (ref 0.6–1)
D DIMER PPP FEU-MCNC: 0.78 UG/MLFEU (ref 0–0.5)
EOSINOPHIL NFR BLD: 0.2 X10^3/UL (ref 0–0.7)
EOSINOPHIL NFR BLD: 5 % (ref 0–3)
ERYTHROCYTE [DISTWIDTH] IN BLOOD BY AUTOMATED COUNT: 13.2 % (ref 11.5–14.5)
GFR SERPLBLD BASED ON 1.73 SQ M-ARVRAT: 31.1 ML/MIN
GLUCOSE SERPL-MCNC: 133 MG/DL (ref 70–99)
HCT VFR BLD CALC: 33.5 % (ref 36–47)
HGB BLD-MCNC: 10.8 G/DL (ref 12–15.5)
INFLUENZA A PATIENT: NEGATIVE
INFLUENZA B PATIENT: NEGATIVE
LYMPHOCYTES # BLD: 1.5 X10^3/UL (ref 1–4.8)
LYMPHOCYTES NFR BLD AUTO: 37 % (ref 24–48)
MAGNESIUM SERPL-MCNC: 2.1 MG/DL (ref 1.8–2.4)
MCH RBC QN AUTO: 31 PG (ref 25–35)
MCHC RBC AUTO-ENTMCNC: 32 G/DL (ref 31–37)
MCV RBC AUTO: 97 FL (ref 79–100)
MONO #: 0.4 X10^3/UL (ref 0–1.1)
MONOCYTES NFR BLD: 9 % (ref 0–9)
NEUT #: 2 X10^3/UL (ref 1.8–7.7)
NEUTROPHILS NFR BLD AUTO: 48 % (ref 31–73)
PLATELET # BLD AUTO: 200 X10^3/UL (ref 140–400)
POTASSIUM SERPL-SCNC: 4.2 MMOL/L (ref 3.5–5.1)
PROT SERPL-MCNC: 7.7 G/DL (ref 6.4–8.2)
PROTHROMBIN TIME: 13.1 SEC (ref 11.7–14)
RBC # BLD AUTO: 3.47 X10^6/UL (ref 3.5–5.4)
SODIUM SERPL-SCNC: 143 MMOL/L (ref 136–145)
WBC # BLD AUTO: 4.1 X10^3/UL (ref 4–11)

## 2022-01-22 PROCEDURE — 80053 COMPREHEN METABOLIC PANEL: CPT

## 2022-01-22 PROCEDURE — 36415 COLL VENOUS BLD VENIPUNCTURE: CPT

## 2022-01-22 PROCEDURE — U0003 INFECTIOUS AGENT DETECTION BY NUCLEIC ACID (DNA OR RNA); SEVERE ACUTE RESPIRATORY SYNDROME CORONAVIRUS 2 (SARS-COV-2) (CORONAVIRUS DISEASE [COVID-19]), AMPLIFIED PROBE TECHNIQUE, MAKING USE OF HIGH THROUGHPUT TECHNOLOGIES AS DESCRIBED BY CMS-2020-01-R: HCPCS

## 2022-01-22 PROCEDURE — 84484 ASSAY OF TROPONIN QUANT: CPT

## 2022-01-22 PROCEDURE — G0378 HOSPITAL OBSERVATION PER HR: HCPCS

## 2022-01-22 PROCEDURE — 85025 COMPLETE CBC W/AUTO DIFF WBC: CPT

## 2022-01-22 PROCEDURE — 87428 SARSCOV & INF VIR A&B AG IA: CPT

## 2022-01-22 PROCEDURE — 94640 AIRWAY INHALATION TREATMENT: CPT

## 2022-01-22 PROCEDURE — 93005 ELECTROCARDIOGRAM TRACING: CPT

## 2022-01-22 PROCEDURE — 83880 ASSAY OF NATRIURETIC PEPTIDE: CPT

## 2022-01-22 PROCEDURE — 4B02XSZ MEASUREMENT OF CARDIAC PACEMAKER, EXTERNAL APPROACH: ICD-10-PCS | Performed by: INTERNAL MEDICINE

## 2022-01-22 PROCEDURE — 82962 GLUCOSE BLOOD TEST: CPT

## 2022-01-22 PROCEDURE — 85730 THROMBOPLASTIN TIME PARTIAL: CPT

## 2022-01-22 PROCEDURE — 85379 FIBRIN DEGRADATION QUANT: CPT

## 2022-01-22 PROCEDURE — 71045 X-RAY EXAM CHEST 1 VIEW: CPT

## 2022-01-22 PROCEDURE — 83735 ASSAY OF MAGNESIUM: CPT

## 2022-01-22 PROCEDURE — 85610 PROTHROMBIN TIME: CPT

## 2022-01-22 RX ADMIN — INSULIN LISPRO SCH UNITS: 100 INJECTION, SOLUTION INTRAVENOUS; SUBCUTANEOUS at 21:00

## 2022-01-22 RX ADMIN — PANTOPRAZOLE SODIUM SCH MG: 40 TABLET, DELAYED RELEASE ORAL at 17:30

## 2022-01-22 RX ADMIN — INSULIN LISPRO SCH UNITS: 100 INJECTION, SOLUTION INTRAVENOUS; SUBCUTANEOUS at 17:00

## 2022-01-22 NOTE — CONS
DATE OF CONSULTATION: 2022

REASON FOR CONSULTATION:  Chest pain.



HISTORY OF PRESENT ILLNESS:  The patient is a pleasant 81-year-old woman who 

comes into the ER today for evaluation of chest pain.  She has been in her usual

state of health and apparently she has had some chest pain for about 3 days or 

so and mostly stabbing in nature and appears to be nonanginal in her 

description.  She denies any other significant issues.



PAST MEDICAL HISTORY:

1.  Coronary artery disease status post PCI.

2.  Hypertension.

3.  Dyslipidemia.

4.  Type 2 diabetes.

5.  Anxiety.

6.  History of TIA.

7.  Prior history of dual chamber pacemaker.



SOCIAL HISTORY:  No alcohol, tobacco or illicit drug use.



HOME CARDIOVASCULAR MEDICATIONS:  Include the followin.  Hydrochlorothiazide/triamterene 25/37.5.

2.  Metoprolol succinate 100 mg daily.

3.  Crestor 10 mg daily.

4.  Lisinopril 20 mg daily.

5.  Verapamil 240 mg daily.

6.  Aspirin 81 mg daily.

7.  Gemfibrozil 600 mg b.i.d.



REVIEW OF SYSTEMS:  Negative for 10 out of 14 systems reviewed, unless otherwise

mentioned above in HPI.



ALLERGIES:  MULTIPLE ANTIBIOTICS, MORPHINE, IODINE, DOXYCYCLINE AND TRAMADOL.



PHYSICAL EXAMINATION:

GENERAL:  She is alert and oriented, in no acute distress.

VITAL SIGNS:  Notable for significantly elevated blood pressure with systolic 

blood pressure greater than 230.

HEAD AND NECK:  Otherwise, unremarkable.

CARDIAC:  Regular rate and rhythm without obvious murmurs, rubs or gallops.

LUNGS:  Clear to auscultation.

ABDOMEN:  Soft, nontender, nondistended.

EXTREMITIES:  No clubbing, cyanosis or edema.

NEUROLOGIC:  No focal deficits.

MUSCULOSKELETAL:  No trauma.



DIAGNOSTIC STUDIES:  Chest x-ray is unremarkable.



Echocardiogram and cardiac catheterization in  did not reveal any 

significant pathology.



EKG did not demonstrate any significant abnormalities.



Recent pacemaker interrogation did not reveal any significant abnormalities.



ASSESSMENT:

1.  Chest pain, likely secondary to uncontrolled hypertension.

2.  Known history of coronary artery disease with recent workup as noted above, 

which was unremarkable.

3.  Dyslipidemia.

4.  Obesity.

5.  Status post dual chamber pacemaker.



RECOMMENDATIONS:  We will reinitiate her home medical therapy and monitor her 

for any significant changes to her blood pressure.  She may need additional 

agents.  Supportive care for now.  No further cardiac testing necessary.



Thank you for this consultation.







PSK/NIS

DR: Juanpablo   DD: 2022 15:13

DT: 2022 15:33   TID: [id number]

## 2022-01-22 NOTE — PDOC1
History and Physical


Date of Admission


Date of Admission


DATE: 1/22/22 


TIME: 13:46





Identification/Chief Complaint


Chief Complaint


Chest pain





Source


Source:  Patient





History of Present Illness


History of Present Illness


Ms. High is a 81-year-old female with PMH CAD status post PCI with JAMES, HTN, 

HLD, DM2, anxiety, prior TIA SSS status post dual-chamber pacemaker who comes to

the ED today at the behest of her family for progressive chest pain.  Pain began

3 days ago stabbing sternal area sometimes radiates to her back with some 

associated nausea.  She also has associated diarrhea.  Repositioning and 

activity do not affect it.  She notes that sometimes improves with eating and 

drinking.  On further review she notes that diarrhea has been ongoing for 

several months and she was instructed to take Imodium A-D and feels that it 

occasionally helps.  No other significant medication changes.  She takes Tums 

occasionally for heartburn.


WBC 4.1, Hb 10.8, platelets 200 3, K4.2, BUN 32, CR 1.6, glucose 133, LFTs 

within normal laboratory limits, high-sensitivity troponin is 13, NT proBNP is 

13, INR 1, D-dimer minimally elevated at 0.78 chest radiograph with no acute 

abnormality left-sided dual-chamber pacemaker noted


Admitted for further care.





Past Medical History


Cardiovascular:  CAD, HTN, MI, Hyperlipidemia, Other


Pulmonary:  COPD


CENTRAL NERVOUS SYSTEM:  Periperal neuropathy


GI:  GERD


Heme/Onc:  No pertinent hx


Hepatobiliary:  No pertinent hx


Psych:  No pertinent hx


Musculoskeletal:  Osteoarthritis, Other


Rheumatologic:  No pertinent hx


Infectious disease:  No pertinent hx


Renal/:  No pertinent hx


Endocrine:  Diabetes, Hypothyroidism





Past Surgical History


Past Surgical History:  Pacemaker, Appendectomy, Cholecystectomy, Tonsillectomy,

Hysterectomy, Other





Family History


Family History:  Coronary Artery Disease





Social History


Smoke:  No


ALCOHOL:  none


Drugs:  None





Current Medications


Current Medications





Current Medications


Aspirin (Aspirin Chewable) 324 mg 1X  ONCE PO  Last administered on 1/22/22at 

11:32;  Start 1/22/22 at 10:30;  Stop 1/22/22 at 10:34;  Status DC





Active Scripts


Active


Pantoprazole Sodium  ** (Pantoprazole Sodium) 40 Mg Tablet.dr 40 Mg PO DAILYAC 

30 Days


Dicyclomine Hcl 10 Mg Capsule 10 Mg PO PRN TID PRN 10 Days


Metoprolol Succinate ( Xl ) (Metoprolol Succinate) 100 Mg Tab.er.24h 100 Mg PO 

DAILY


Reported


Mysoline (Primidone) 50 Mg Tablet 3 Tab PO QHS 30 Days


Imodium A-D (Loperamide HCl) 2 Mg Capsule 2 Mg PO TID PRN PRN


Fish Oil 1,000 Mg Softgel (Omega-3 Fatty Acids/Fish Oil) 1 Each Capsule 1,000 

Each PO DAILY


Folic Acid 0.4 Mg Tablet 0.4 Mg PO DAILY


Tramadol Hcl 50 Mg Tablet 50 Mg PO DAILY PRN


Dyazide 37.5-25 Capsule (Triamterene/Hydrochlorothiazid) 1 Each Capsule 1 Cap PO

DAILY


Tessalon Perle (Benzonatate) 100 Mg Capsule 1 Cap PO PRN TID PRN


Lopid (Gemfibrozil) 600 Mg Tablet 1 Tab PO BID


Actos (Pioglitazone Hcl) 15 Mg Tablet 1 Tab PO DAILY


Lisinopril 20 Mg Tablet 1 Tab PO DAILY


Gabapentin ** (Gabapentin) 300 Mg Capsule 300 Mg PO TID


Crestor (Rosuvastatin Calcium) 10 Mg Tablet 1 Tab PO QHS


Verapamil Er (Verapamil Hcl) 240 Mg Cap24h.pel 240 Mg PO DAILY


Levothyroxine Sodium 112 Mcg Tablet 112 Mcg PO DAILYAC


Proair Hfa Inhaler (Albuterol Sulfate) 8.5 Gm Hfa.aer.ad 1 Puff INH PRN Q6HRS 

PRN


Trazodone Hcl 50 Mg Tablet 50 Mg PO HS


Sertraline Hcl 100 Mg Tablet 100 Mg PO DAILY


Combivent Respimat Inhal (Ipratropium/Albuterol Sulfate) 4 Gm Aer.w.adap 2 Inh 

IH QID


Allegra Allergy (Fexofenadine Hcl) 60 Mg Tablet 60 Mg PO DAILY


Nitrostat (Nitroglycerin) 0.4 Mg Tab.subl 1 Tab SL UD


Aspir 81 (Aspirin) 81 Mg Tablet.dr 1 Tab PO DAILY


Multi Vitamin Daily (Multivitamin) 1 Each Tablet 1 Each PO 


Iron 18 Mg Tablet 65 Mg PO BID





Allergies


Allergies:  


Coded Allergies:  


     Iodinated Contrast Media (Verified  Allergy, Intermediate, FLU SYMPTOMS, 

NASAL CONGESTION, 10/14/19)


   REACTION WAS 40 YEARS AGO.  NO ALLERGY TO BETADINE


     Tetracyclines (Verified  Allergy, Intermediate, 10/31/16)


     codeine (Verified  Allergy, Intermediate, 5/12/21)


     morphine (Verified  Allergy, Intermediate, 10/31/16)


     nitrofurantoin (Verified  Allergy, Intermediate, 10/31/16)


     tetrabenazine (Verified  Allergy, Intermediate, 6/25/19)





Physical Exam


General:  Alert, Oriented X3, Cooperative, mild distress


HEENT:  Atraumatic, PERRLA, EOMI, Mucous membr. moist/pink


Lungs:  Clear to auscultation, Normal air movement


Heart:  S1S2, RRR, no thrills, no rubs, no gallops, no murmurs


Abdomen:  Normal bowel sounds, Soft, No hepatosplenomegaly, No masses, Other 

(Epigastric tenderness)


Rectal Exam:  not examined


Extremities:  No clubbing, No cyanosis, No edema, Normal pulses, No 

tenderness/swelling


Skin:  No rashes, No breakdown, No significant lesion


Neuro:  Normal gait, Normal speech, Strength at 5/5 X4 ext, Normal tone, 

Sensation intact, Cranial nerves 3-12 NL, Reflexes 2+


Psych/Mental Status:  Mental status NL, Mood NL





Vitals


Vitals





Vital Signs








  Date Time  Temp Pulse Resp B/P (MAP) Pulse Ox O2 Delivery O2 Flow Rate FiO2


 


1/22/22 10:21 98.1 64 21 237/97 (143) 100 Room Air  





 98.1       











Labs


Labs





Laboratory Tests








Test


 1/22/22


10:43 1/22/22


13:00


 


White Blood Count


 4.1 x10^3/uL


(4.0-11.0) 





 


Red Blood Count


 3.47 x10^6/uL


(3.50-5.40) 





 


Hemoglobin


 10.8 g/dL


(12.0-15.5) 





 


Hematocrit


 33.5 %


(36.0-47.0) 





 


Mean Corpuscular Volume 97 fL ()  


 


Mean Corpuscular Hemoglobin 31 pg (25-35)  


 


Mean Corpuscular Hemoglobin


Concent 32 g/dL


(31-37) 





 


Red Cell Distribution Width


 13.2 %


(11.5-14.5) 





 


Platelet Count


 200 x10^3/uL


(140-400) 





 


Neutrophils (%) (Auto) 48 % (31-73)  


 


Lymphocytes (%) (Auto) 37 % (24-48)  


 


Monocytes (%) (Auto) 9 % (0-9)  


 


Eosinophils (%) (Auto) 5 % (0-3)  


 


Basophils (%) (Auto) 1 % (0-3)  


 


Neutrophils # (Auto)


 2.0 x10^3/uL


(1.8-7.7) 





 


Lymphocytes # (Auto)


 1.5 x10^3/uL


(1.0-4.8) 





 


Monocytes # (Auto)


 0.4 x10^3/uL


(0.0-1.1) 





 


Eosinophils # (Auto)


 0.2 x10^3/uL


(0.0-0.7) 





 


Basophils # (Auto)


 0.1 x10^3/uL


(0.0-0.2) 





 


Prothrombin Time


 13.1 SEC


(11.7-14.0) 





 


Prothromb Time International


Ratio 1.0 (0.8-1.1) 


 





 


Activated Partial


Thromboplast Time 26 SEC (24-38) 


 





 


D-Dimer (Jasmin)


 0.78 ug/mlFEU


(0.00-0.50) 





 


Sodium Level


 143 mmol/L


(136-145) 





 


Potassium Level


 4.2 mmol/L


(3.5-5.1) 





 


Chloride Level


 103 mmol/L


() 





 


Carbon Dioxide Level


 28 mmol/L


(21-32) 





 


Anion Gap 12 (6-14)  


 


Blood Urea Nitrogen


 32 mg/dL


(7-20) 





 


Creatinine


 1.6 mg/dL


(0.6-1.0) 





 


Estimated GFR


(Cockcroft-Gault) 31.1 


 





 


BUN/Creatinine Ratio 20 (6-20)  


 


Glucose Level


 133 mg/dL


(70-99) 





 


Calcium Level


 8.8 mg/dL


(8.5-10.1) 





 


Magnesium Level


 2.1 mg/dL


(1.8-2.4) 





 


Total Bilirubin


 0.2 mg/dL


(0.2-1.0) 





 


Aspartate Amino Transf


(AST/SGOT) 14 U/L (15-37) 


 





 


Alanine Aminotransferase


(ALT/SGPT) 18 U/L (14-59) 


 





 


Alkaline Phosphatase


 85 U/L


() 





 


Troponin I High Sensitivity 13 ng/L (4-50)  12 ng/L (4-50) 


 


NT-Pro-B-Type Natriuretic


Peptide 262 pg/mL


(0-449) 





 


Total Protein


 7.7 g/dL


(6.4-8.2) 





 


Albumin


 3.5 g/dL


(3.4-5.0) 





 


Albumin/Globulin Ratio 0.8 (1.0-1.7)  








Laboratory Tests








Test


 1/22/22


10:43 1/22/22


13:00


 


White Blood Count


 4.1 x10^3/uL


(4.0-11.0) 





 


Red Blood Count


 3.47 x10^6/uL


(3.50-5.40) 





 


Hemoglobin


 10.8 g/dL


(12.0-15.5) 





 


Hematocrit


 33.5 %


(36.0-47.0) 





 


Mean Corpuscular Volume 97 fL ()  


 


Mean Corpuscular Hemoglobin 31 pg (25-35)  


 


Mean Corpuscular Hemoglobin


Concent 32 g/dL


(31-37) 





 


Red Cell Distribution Width


 13.2 %


(11.5-14.5) 





 


Platelet Count


 200 x10^3/uL


(140-400) 





 


Neutrophils (%) (Auto) 48 % (31-73)  


 


Lymphocytes (%) (Auto) 37 % (24-48)  


 


Monocytes (%) (Auto) 9 % (0-9)  


 


Eosinophils (%) (Auto) 5 % (0-3)  


 


Basophils (%) (Auto) 1 % (0-3)  


 


Neutrophils # (Auto)


 2.0 x10^3/uL


(1.8-7.7) 





 


Lymphocytes # (Auto)


 1.5 x10^3/uL


(1.0-4.8) 





 


Monocytes # (Auto)


 0.4 x10^3/uL


(0.0-1.1) 





 


Eosinophils # (Auto)


 0.2 x10^3/uL


(0.0-0.7) 





 


Basophils # (Auto)


 0.1 x10^3/uL


(0.0-0.2) 





 


Prothrombin Time


 13.1 SEC


(11.7-14.0) 





 


Prothromb Time International


Ratio 1.0 (0.8-1.1) 


 





 


Activated Partial


Thromboplast Time 26 SEC (24-38) 


 





 


D-Dimer (Jasmin)


 0.78 ug/mlFEU


(0.00-0.50) 





 


Sodium Level


 143 mmol/L


(136-145) 





 


Potassium Level


 4.2 mmol/L


(3.5-5.1) 





 


Chloride Level


 103 mmol/L


() 





 


Carbon Dioxide Level


 28 mmol/L


(21-32) 





 


Anion Gap 12 (6-14)  


 


Blood Urea Nitrogen


 32 mg/dL


(7-20) 





 


Creatinine


 1.6 mg/dL


(0.6-1.0) 





 


Estimated GFR


(Cockcroft-Gault) 31.1 


 





 


BUN/Creatinine Ratio 20 (6-20)  


 


Glucose Level


 133 mg/dL


(70-99) 





 


Calcium Level


 8.8 mg/dL


(8.5-10.1) 





 


Magnesium Level


 2.1 mg/dL


(1.8-2.4) 





 


Total Bilirubin


 0.2 mg/dL


(0.2-1.0) 





 


Aspartate Amino Transf


(AST/SGOT) 14 U/L (15-37) 


 





 


Alanine Aminotransferase


(ALT/SGPT) 18 U/L (14-59) 


 





 


Alkaline Phosphatase


 85 U/L


() 





 


Troponin I High Sensitivity 13 ng/L (4-50)  12 ng/L (4-50) 


 


NT-Pro-B-Type Natriuretic


Peptide 262 pg/mL


(0-449) 





 


Total Protein


 7.7 g/dL


(6.4-8.2) 





 


Albumin


 3.5 g/dL


(3.4-5.0) 





 


Albumin/Globulin Ratio 0.8 (1.0-1.7)  











Images


Images


Chest radiograph:


Left chest dual-chamber pacemaker. The lungs are adequately and symmetrically 

inflated. No airspace consolidation, pleural effusion or pneumothorax. The c

ardiomediastinal silhouette and pulmonary vasculature are within normal limits. 

No acute osseous abnormality. Soft tissues are unremarkable.





Impression: 


1.  No acute cardiopulmonary process.





VTE Prophylaxis Ordered


VTE Prophylaxis Devices:  Yes


VTE Pharmacological Prophylaxi:  Yes





Assessment/Plan


Assessment/Plan


A/P:


Chest pain - likely GI/GERD related. Atypical for chest pain. Had negative LHC 1

year ago


Coronary artery disease status post PCI - patent stents as of 1 year ago


HTN - metoprolol and verapamil. May need to adjust meds


HLD - statin


DM2 - sliding scale insulin. Hold actos for now


Anxiety - on zoloft


History of TIA - stable on ASA, statin


S/p dual chamber pacemaker - interrogated


Diarrhea - possibly with some PUD as well given her symptoms Will place on PPI 

BID, simethicone





FEN - ADA diet


PPX - heparin


CODE - DNR/DNI


Dispo - inpatient for chest pain, likely GI etiology. Cardiology consulted





Justifications for Admission


Other Justification














SAYDA DHILLON MD        Jan 22, 2022 13:46

## 2022-01-22 NOTE — PHYS DOC
Past Medical History


Past Medical History:  Anemia, Anxiety, Bronchitis, CAD, COPD, Depression, 

Diabetes-Type II, High Cholesterol, Hypertension, Hypothyroid, MI, Renal 

Disease, TIA, Other


Additional Past Medical Histor:  COVID-19/NOV 2020,NEUROPATHY


Past Surgical History:  Angioplasty, Appendectomy, Cholecystectomy, Hyster

ectomy, Pacemaker, Tonsillectomy


Additional Past Surgical Histo:  6 cardiac stents,


Smoking Status:  Never Smoker


Alcohol Use:  None


Drug Use:  None





General Adult


EDM:


Chief Complaint:  CHEST PAIN





HPI:


HPI:





Patient is a 81-year-old female that presents today with substernal chest pain. 

Patient states the pain started about 3 days ago she said it was right 

underneath her breastbone and she said it is her in the stabbing type sensation 

for the last 3 days. She states the pain is worse when she takes a deep breath 

or moves side to side with her arms. She has no associated shortness of air 

fever diaphoresis or nausea and vomiting with the pain.  When asked if she 

called her cardiologist or primary care physician regarding the pain she said no

she did not think about that.  Patient states that she has been taking her 

medications as prescribed.  Patient denies cough as well.  While reviewing 

patient's past medical history it was noted that she had a cardiac cath done in 

May 2021, and according to the son patient saw Dr. Ch in November 2021.





Review of Systems:


Review of Systems:


Constitutional:   Denies fever or chills. []


Eyes:   Denies change in visual acuity. []


HENT:   Denies nasal congestion or sore throat. [] 


Respiratory:   Denies cough or shortness of breath. [] 


Cardiovascular:   Substernal chest pain


GI:   Chronic nausea denies abdominal pain, vomiting, bloody stools or diarrhea.

 [] 


:  Denies dysuria. [] 


Musculoskeletal:   Denies back pain or joint pain. [] 


Integument:   Denies rash. [] 


Neurologic:   Denies headache, focal weakness or sensory changes. [] 


Endocrine:   Denies polyuria or polydipsia. [] 


Lymphatic:  Denies swollen glands. [] 


Psychiatric:  Denies depression or anxiety. []





Heart Score:


C/O Chest Pain:  Yes


HEART Score for Chest Pain:  








HEART Score for Chest Pain Response (Comments) Value


 


History Moderately Suspicious 1


 


ECG Nonspecific Repolarizatio 1


 


Age > 65 2


 


Risk Factors >3 Risk Factors or Hx CAD 2


 


Troponin < Normal Limit 0


 


Total  6








Risk Factors:


Risk Factors:  DM, Current or recent (<one month) smoker, HTN, HLP, family 

history of CAD, obesity.


Risk Scores:


Score 0 - 3:  2.5% MACE over next 6 weeks - Discharge Home


Score 4 - 6:  20.3% MACE over next 6 weeks - Admit for Clinical Observation


Score 7 - 10:  72.7% MACE over next 6 weeks - Early Invasive Strategies





Current Medications:





Current Medications








 Medications


  (Trade)  Dose


 Ordered  Sig/Nga  Start Time


 Stop Time Status Last Admin


Dose Admin


 


 Aspirin


  (Aspirin


 Chewable)  324 mg  1X  ONCE  1/22/22 10:30


 1/22/22 10:34 DC  














Allergies:


Allergies:





Allergies








Coded Allergies Type Severity Reaction Last Updated Verified


 


  Iodinated Contrast Media Allergy Intermediate FLU SYMPTOMS, NASAL CONGESTION 

10/14/19 Yes


 


  Tetracyclines Allergy Intermediate  10/31/16 Yes


 


  codeine Allergy Intermediate  5/12/21 Yes


 


  morphine Allergy Intermediate  10/31/16 Yes


 


  nitrofurantoin Allergy Intermediate  10/31/16 Yes


 


  tetrabenazine Allergy Intermediate  6/25/19 Yes











Physical Exam:


PE:





Constitutional: Well developed, well nourished, no acute distress, non-toxic 

appearance. []


HENT: Normocephalic, atraumatic, bilateral external ears normal, oropharynx 

moist, no oral exudates, nose normal. []


Eyes: PERRLA, EOMI, conjunctiva normal, no discharge. [] 


Neck: Normal range of motion, no tenderness, supple, no stridor. [] 


Cardiovascular: Monitor shows patient is 100% paced.  No jugular vein distention

 noted no murmur.


Lungs & Thorax:  Bilateral breath sounds clear to auscultation []


Abdomen: Large round obese abdomen no tenderness noted with palpation, patient 

states she feels bloated.  Bowel sounds hyperactive


Skin: Pale, warm, dry.


Back: No tenderness, no CVA tenderness. [] 


Extremities: No tenderness, no cyanosis, no clubbing, ROM intact, 1+ edema 

bilaterally, 1+ pedal and peripheral pulses.  Cap refill is less than 2 seconds


Neurologic: Alert and oriented X 3, normal motor function, normal sensory 

function, no focal deficits noted. []


Psychologic: Affect flat affect and no eye contact while assessment was going 

on., judgement normal, mood normal. []





Current Patient Data:


Labs:





                                Laboratory Tests








Test


 1/22/22


10:43


 


White Blood Count


 4.1 x10^3/uL


(4.0-11.0)


 


Red Blood Count


 3.47 x10^6/uL


(3.50-5.40)  L


 


Hemoglobin


 10.8 g/dL


(12.0-15.5)  L


 


Hematocrit


 33.5 %


(36.0-47.0)  L


 


Mean Corpuscular Volume


 97 fL ()





 


Mean Corpuscular Hemoglobin 31 pg (25-35)  


 


Mean Corpuscular Hemoglobin


Concent 32 g/dL


(31-37)


 


Red Cell Distribution Width


 13.2 %


(11.5-14.5)


 


Platelet Count


 200 x10^3/uL


(140-400)


 


Neutrophils (%) (Auto) 48 % (31-73)  


 


Lymphocytes (%) (Auto) 37 % (24-48)  


 


Monocytes (%) (Auto) 9 % (0-9)  


 


Eosinophils (%) (Auto) 5 % (0-3)  H


 


Basophils (%) (Auto) 1 % (0-3)  


 


Neutrophils # (Auto)


 2.0 x10^3/uL


(1.8-7.7)


 


Lymphocytes # (Auto)


 1.5 x10^3/uL


(1.0-4.8)


 


Monocytes # (Auto)


 0.4 x10^3/uL


(0.0-1.1)


 


Eosinophils # (Auto)


 0.2 x10^3/uL


(0.0-0.7)


 


Basophils # (Auto)


 0.1 x10^3/uL


(0.0-0.2)


 


Prothrombin Time


 13.1 SEC


(11.7-14.0)


 


Prothrombin Time INR 1.0 (0.8-1.1)  


 


Activated Partial


Thromboplast Time 26 SEC (24-38)





 


D-Dimer (Jasmin)


 0.78 ug/mlFEU


(0.00-0.50)  H


 


Sodium Level


 143 mmol/L


(136-145)


 


Potassium Level


 4.2 mmol/L


(3.5-5.1)


 


Chloride Level


 103 mmol/L


()


 


Carbon Dioxide Level


 28 mmol/L


(21-32)


 


Anion Gap 12 (6-14)  


 


Blood Urea Nitrogen


 32 mg/dL


(7-20)  H


 


Creatinine


 1.6 mg/dL


(0.6-1.0)  H


 


Estimated GFR


(Cockcroft-Gault) 31.1  





 


BUN/Creatinine Ratio 20 (6-20)  


 


Glucose Level


 133 mg/dL


(70-99)  H


 


Calcium Level


 8.8 mg/dL


(8.5-10.1)


 


Magnesium Level


 2.1 mg/dL


(1.8-2.4)


 


Total Bilirubin


 0.2 mg/dL


(0.2-1.0)


 


Aspartate Amino Transferase


(AST) 14 U/L (15-37)


L


 


Alanine Aminotransferase (ALT)


 18 U/L (14-59)





 


Alkaline Phosphatase


 85 U/L


()


 


Troponin I High Sensitivity


 13 ng/L (4-50)





 


Total Protein


 7.7 g/dL


(6.4-8.2)


 


Albumin


 3.5 g/dL


(3.4-5.0)


 


Albumin/Globulin Ratio


 0.8 (1.0-1.7)


L





                                Laboratory Tests


1/22/22 10:43








                                Laboratory Tests


1/22/22 10:43








Vital Signs:





Vital Signs








  Date Time  Temp Pulse Resp B/P (MAP) Pulse Ox O2 Delivery O2 Flow Rate FiO2


 


1/22/22 19:00 97.3 59 20 158/48 (84) 94 Room Air  





 97.3       


 


1/22/22 15:15      Room Air  


 


1/22/22 15:03 98.0 60 20 145/60 (88) 100 Room Air  





 98.0       


 


1/22/22 13:38  61  181/67 (105) 99 Room Air  


 


1/22/22 12:38  60  180/77 (111) 99 Room Air  


 


1/22/22 11:38  60  176/73 (107) 99 Room Air  


 


1/22/22 10:38  63  203/81 (121) 99 Room Air  


 


1/22/22 10:21 98.1 64 21 237/97 (143) 100 Room Air  





 98.1       








                                   Vital Signs








  Date Time  Temp Pulse Resp B/P (MAP) Pulse Ox O2 Delivery O2 Flow Rate FiO2


 


1/22/22 10:21 98.1 64 21 237/97 (143) 100 Room Air  





 98.1       











EKG:


EKG:


EKG done at 1129 read by Dr. Marshall at 1130 sinus rhythm with a left bundle 

branch block at a rate of 62 OK interval of 196 ms with a QTC of 439 ms no STEMI

 []





Radiology/Procedures:


Radiology/Procedures:


REASON: chest pain


PROCEDURE: PORTABLE CHEST 1V





XR CHEST 1V





History: Chest pain





Comparison: 5/11/2021





Technique: AP radiograph of the chest.





Findings:


Left chest dual-chamber pacemaker. The lungs are adequately and symmetrically 

inflated. No airspace consolidation, pleural effusion or pneumothorax. The 

cardiomediastinal silhouette and pulmonary vasculature are within normal limits.

 No acute osseous abnormality. Soft tissues are unremarkable.





Impression: 


1.  No acute cardiopulmonary process.





Electronically signed by: Sonido Hancock MD (1/22/2022 11:02 AM) [facility]


[]





Course & Med Decision Making:


Course & Med Decision Making


Pertinent Labs and Imaging studies reviewed. (See chart for details)





[1215 reviewed radiological and laboratory results with Dr. Tao, with the 

patient's past medical history of coronary artery disease I feel it is safe if 

the patient is admitted for further evaluation of her chest pain.  He is 

agreeable to admission.  I did consult Dr. Ch for cardiology input on her

 care.  Patient was informed of the decision for admission and she is agreeable 

to the plan of care.





Dragon Disclaimer:


Dragon Disclaimer:


This electronic medical record was generated, in whole or in part, using a voice

 recognition dictation system.





Departure


Departure


Impression:  


   Primary Impression:  


   Chest pain


   Qualified Codes:  R07.9 - Chest pain, unspecified


Disposition:  09 ADMITTED AS INPATIENT


Admitting Physician:  HIMS


Condition:  STABLE


Referrals:  


DANIELLA LINTON MD (PCP)











MARS PAGAN       Jan 22, 2022 11:24

## 2022-01-22 NOTE — RAD
XR CHEST 1V



History: Chest pain



Comparison: 5/11/2021



Technique: AP radiograph of the chest.



Findings:

Left chest dual-chamber pacemaker. The lungs are adequately and symmetrically inflated. No airspace c
onsolidation, pleural effusion or pneumothorax. The cardiomediastinal silhouette and pulmonary vascul
ature are within normal limits. No acute osseous abnormality. Soft tissues are unremarkable.



Impression: 

1.  No acute cardiopulmonary process.



Electronically signed by: Sonido Hancock MD (1/22/2022 11:02 AM) [facility]

## 2022-01-23 VITALS — DIASTOLIC BLOOD PRESSURE: 48 MMHG | SYSTOLIC BLOOD PRESSURE: 136 MMHG

## 2022-01-23 VITALS — SYSTOLIC BLOOD PRESSURE: 174 MMHG | DIASTOLIC BLOOD PRESSURE: 64 MMHG

## 2022-01-23 VITALS — SYSTOLIC BLOOD PRESSURE: 125 MMHG | DIASTOLIC BLOOD PRESSURE: 41 MMHG

## 2022-01-23 VITALS — DIASTOLIC BLOOD PRESSURE: 41 MMHG | SYSTOLIC BLOOD PRESSURE: 125 MMHG

## 2022-01-23 RX ADMIN — INSULIN LISPRO SCH UNITS: 100 INJECTION, SOLUTION INTRAVENOUS; SUBCUTANEOUS at 12:00

## 2022-01-23 RX ADMIN — PANTOPRAZOLE SODIUM SCH MG: 40 TABLET, DELAYED RELEASE ORAL at 07:30

## 2022-01-23 RX ADMIN — INSULIN LISPRO SCH UNITS: 100 INJECTION, SOLUTION INTRAVENOUS; SUBCUTANEOUS at 08:00

## 2022-01-23 NOTE — NUR
Patient discharged home to son. Patient took belongings from room, including shirt, pants, 
shoes, jacket and cell phone. (Glasses were not brought up yesterday, and purse had been 
taken home after arrival to hospital by son) [id number]

## 2022-01-23 NOTE — EKG
[facility]

              [address] [place] [address]

Test Date:    2022               Test Time:    11:27:14

Pat Name:     CHRISTIANO BUSTILLO              Department:   

Patient ID:   [id number]           Room:         [facility]

Gender:       F                        Technician:   

:          [id number]               Requested By: MARS PAGAN

Order Number: [id number]           Reading MD:   Bashir Ch MD

                                 Measurements

Intervals                              Axis          

Rate:         68                       P:            

NH:                                    QRS:          -37

QRSD:         150                      T:            72

QT:           424                                    

QTc:          451                                    

                           Interpretive Statements

A-V PACING

Electronically Signed On 2022 9:21:21 CST by Bashir Ch MD

## 2022-01-23 NOTE — DISCH
DISCHARGE INSTRUCTIONS


Condition on Discharge


Condition on Discharge:  Stable





Activity After Discharge


Activity Instructions for Disc:  Activity as tolerated


Bathing Instructions:  No Tub Bath until see 


Lifting Instructions after Dis:  No heavy lifting, No pulling or pushing, Do not

lift >10 pounds


Exercise Instruction after Dis:  Progress as tolerated


Driving Instructions after Dis:  Do not drive today


Weight Bearing Status after Di:  As tolerated





Diet after Discharge


Diet after Discharge:  Diabetic No Calorie Level


Diet Texture:  Regular


Liquid Texture:  Thin Liquid


Swallowing Supervision:  None needed





Wound Incision Care


Wound/Incision Care:  No wound care needed





Checks after Discharge


Checks after discharge:  Check blood press - daily, Check blood sugar, ac/hs, 

Check your Temp as needed





Contacting the DR. after DC


Call your doctor for:  If your condition worsens





Follow-Up


Follow up with:  PCP within 2 weeks of discharge


Follow Up With:  Cardiology as scheduled or as needed





Treatment/Equipment after DC


Adaptive Equipment Issued:  LUCIAN Rankin MD                  Jan 23, 2022 11:19

## 2022-01-23 NOTE — PDOC
CARDIOLOGY PROGRESS NOTE


SUBJECTIVE:


No acute events overnight.  The patient reports that she is feeling better.  

Blood pressure is much better controlled.





OBJECTIVE:


Vital Signs/I&O:





                                   Vital Signs








  Date Time  Temp Pulse Resp B/P (MAP) Pulse Ox O2 Delivery O2 Flow Rate FiO2


 


1/23/22 12:57  59  125/41    


 


1/23/22 11:00 97.4  18  97 Room Air  





 97.4       














                                    I & O   


 


 1/22/22 1/22/22 1/23/22





 15:00 23:00 07:00


 


Output Total   0 ml


 


Balance   0 ml








Objective:


The patient appeared well nourished and normally developed. 


Head exam is unremarkable. No scleral icterus or corneal arcus noted. Neck is 

without jugular venous distension, thyromegaly, or carotid bruits. Carotid 

upstrokes are brisk bilaterally. 


Lungs are clear to auscultation and percussion.


 Cardiac exam reveals the PMI to be normally sized and situated. Rhythm is 

regular. First and second heart sounds normal. No murmurs, rubs or gallops. 


Abdominal exam reveals normal bowel sounds, no masses, no organomegaly and no 

aortic enlargement. 


Extremities are nonedematous and both femoral and pedal pulses are normal.


Msk: No traumua


Neuro: No focal deficits





CURRENT MEDICATIONS:





Current Medications








 Medications


  (Trade)  Dose


 Ordered  Sig/Nga


 Route


 PRN Reason  Start Time


 Stop Time Status Last Admin


Dose Admin


 


 Acetaminophen


  (Tylenol)  650 mg  PRN Q6HRS  PRN


 PO


 MILD PAIN / TEMP > 100.3'F  1/22/22 15:45


    1/22/22 21:46





 


 Psyllium


 Hydrophilic


 Mucilloid


  (Metamucil Fiber


 Packet)  1 pkt  QHS


 PO


   1/22/22 21:00


    1/22/22 21:47





 


 Aspirin


  (Ecotrin)  81 mg  DAILY


 PO


   1/23/22 09:00


    1/23/22 09:00





 


 Gabapentin


  (Neurontin)  300 mg  QHS


 PO


   1/22/22 21:00


    1/22/22 21:47





 


 Levothyroxine


 Sodium


  (Synthroid)  112 mcg  DAILYAC


 PO


   1/23/22 07:30


    1/23/22 07:30





 


 Lisinopril


  (Prinivil)  20 mg  DAILY


 PO


   1/23/22 09:00


    1/23/22 09:00





 


 Metoprolol


 Succinate


  (Toprol Xl)  100 mg  DAILY


 PO


   1/23/22 09:00


    1/23/22 09:00





 


 Pantoprazole


 Sodium


  (Protonix)  40 mg  BIDAC


 PO


   1/22/22 17:30


    1/23/22 07:30





 


 Trazodone HCl


  (Desyrel)  50 mg  HS


 PO


   1/22/22 21:00


    1/22/22 21:47





 


 Cetirizine HCl


  (ZyrTEC)  10 mg  DAILY


 PO


   1/23/22 09:00


    1/23/22 09:00





 


 Folic Acid


  (Folic Acid)  1 mg  DAILY


 PO


   1/23/22 09:00


    1/23/22 09:00





 


 Atorvastatin


 Calcium


  (Lipitor)  40 mg  QHS


 PO


   1/22/22 21:00


    1/22/22 21:46





 


 Sertraline HCl


  (Zoloft)  100 mg  DAILY


 PO


   1/23/22 09:00


    1/23/22 09:00





 


 Verapamil HCl


  (Calan Sr)  240 mg  DAILY


 PO


   1/23/22 09:00


    1/23/22 12:57














DIAGNOSTIC TESTING:


No new diagnostic studies


Labs:





Laboratory Tests








Test


 1/22/22


16:40 1/22/22


20:24 1/23/22


07:41 1/23/22


12:05


 


Troponin I High Sensitivity


 14 ng/L (4-50)


 


 


 





 


Glucose (Fingerstick)


 


 114 mg/dL


(70-99)  H 72 mg/dL


(70-99) 108 mg/dL


(70-99)  H











ASSESSMENT:


1.  Noncardiac chest pain


2.  Labile blood pressure


3.  Coronary artery disease currently without angina and recent cardiac 

catheterization unremarkable


4.  Tachybradycardia syndrome status post pacemaker, remote, normal function on 

recent evaluation





PLAN:


1.  Continue current medical therapy.  No further cardiovascular testing or 

changes necessary.  Okay to discharge from a cardiac standpoint.  We will 

follow-up with her in the office in discuss any titration of medical therapy as 

needed.





Justicifation of Admission Dx:


Justifications for Admission:


Justification of Admission Dx:  Yes











HYUN BELLA MD       Jan 23, 2022 15:31

## 2022-01-23 NOTE — EKG
[facility]

              [address] [place] [address]

Test Date:    2022               Test Time:    11:29:22

Pat Name:     CHRISTIANO BUSTILLO              Department:   

Patient ID:   [id number]           Room:         512 

Gender:       F                        Technician:   

:          1940               Requested By: MARS PAGAN

Order Number: [id number]           Reading MD:   Bashir Ch MD

                                 Measurements

Intervals                              Axis          

Rate:         62                       P:            48

LA:           196                      QRS:          -32

QRSD:         154                      T:            35

QT:           430                                    

QTc:          439                                    

                           Interpretive Statements

A-V PACING

Electronically Signed On 2022 9:21:10 CST by Bashir Ch MD

## 2022-01-23 NOTE — PDOC
[facility] PROGRESS NOTE


Date of Service


DOS:


DATE: 1/23/22 


TIME: 14:29





Chief Complaint


Chief Complaint


Assessment/Plan


A/P:


Chest pain - likely GI/GERD related. Atypical for chest pain. Had negative LHC 1

year ago


Coronary artery disease status post PCI - patent stents as of 1 year ago


HTN - metoprolol and verapamil. May need to adjust meds


HLD - statin


DM2 - sliding scale insulin. Hold actos for now


Anxiety - on zoloft


History of TIA - stable on ASA, statin


S/p dual chamber pacemaker - interrogated


Diarrhea - possibly with some PUD as well given her symptoms Will place on PPI 

BID, simethicone








FEN - ADA diet


PPX - heparin


CODE - DNR/DNI


Dispo - inpatient for chest pain, likely GI etiology. Cardiology consulted





History of Present Illness


History of Present Illness


81-year-old female with PMH CAD status post PCI with JAMES, HTN, HLD, DM2, 

anxiety, prior TIA SSS status post dual-chamber pacemaker who comes to the ED 

today at the behest of her family for progressive chest pain.  Pain began 3 days

ago stabbing sternal area sometimes radiates to her back with some associated 

nausea.  She also has associated diarrhea.  Repositioning and activity do not a

ffect it.  She notes that sometimes improves with eating and drinking.  On 

further review she notes that diarrhea has been ongoing for several months and 

she was instructed to take Imodium A-D and feels that it occasionally helps.  No

other significant medication changes.  She takes Tums occasionally for 

heartburn.


WBC 4.1, Hb 10.8, platelets 200 3, K4.2, BUN 32, CR 1.6, glucose 133, LFTs 

within normal laboratory limits, high-sensitivity troponin is 13, NT proBNP is 

13, INR 1, D-dimer minimally elevated at 0.78 chest radiograph with no acute 

abnormality left-sided dual-chamber pacemaker noted


Admitted for further care.





1/23/2022


No acute events overnight.  Patient seen examined bedside.  Patient's blood 

pressures are better controlled but continues to have some labile blood 

pressures.  Measured at 174/64 around 11:00 AM.  We will continue to observe 

today and see if we need to adjust her blood pressure medications any further.  

Patient's chart, labs, images were reviewed and discussed with RN





Vitals/I&O


Vitals/I&O:





                                   Vital Signs








  Date Time  Temp Pulse Resp B/P (MAP) Pulse Ox O2 Delivery O2 Flow Rate FiO2


 


1/23/22 12:57  59  125/41    


 


1/23/22 11:00 97.4  18  97 Room Air  





 97.4       














                                    I & O   


 


 1/22/22 1/22/22 1/23/22





 15:00 23:00 07:00


 


Output Total   0 ml


 


Balance   0 ml











Physical Exam


General:  Alert, Oriented X3, Cooperative, mild distress


Lungs:  Clear


Abdomen:  Normal bowel sounds, Soft, No hepatosplenomegaly, No masses, Other 

(Epigastric tenderness)


Extremities:  No clubbing, No cyanosis, No edema, Normal pulses, No 

tenderness/swelling


Skin:  No rashes, No breakdown, No significant lesion





Labs


Labs:





Laboratory Tests








Test


 1/22/22


16:40 1/22/22


20:24 1/23/22


07:41 1/23/22


12:05


 


Troponin I High Sensitivity 14 ng/L (4-50)    


 


Glucose (Fingerstick)


 


 114 mg/dL


(70-99) 72 mg/dL


(70-99) 108 mg/dL


(70-99)











Comment


Review of Relevant


I have reviewed the following items cammie (where applicable) has been applied.


Medications:





Current Medications








 Medications


  (Trade)  Dose


 Ordered  Sig/Nga


 Route


 PRN Reason  Start Time


 Stop Time Status Last Admin


Dose Admin


 


 Acetaminophen


  (Tylenol)  650 mg  PRN Q6HRS  PRN


 PO


 MILD PAIN / TEMP > 100.3'F  1/22/22 15:45


    1/22/22 21:46





 


 Psyllium


 Hydrophilic


 Mucilloid


  (Metamucil Fiber


 Packet)  1 pkt  QHS


 PO


   1/22/22 21:00


    1/22/22 21:47





 


 Aspirin


  (Ecotrin)  81 mg  DAILY


 PO


   1/23/22 09:00


    1/23/22 09:00





 


 Gabapentin


  (Neurontin)  300 mg  QHS


 PO


   1/22/22 21:00


    1/22/22 21:47





 


 Levothyroxine


 Sodium


  (Synthroid)  112 mcg  DAILYAC


 PO


   1/23/22 07:30


    1/23/22 07:30





 


 Lisinopril


  (Prinivil)  20 mg  DAILY


 PO


   1/23/22 09:00


    1/23/22 09:00





 


 Metoprolol


 Succinate


  (Toprol Xl)  100 mg  DAILY


 PO


   1/23/22 09:00


    1/23/22 09:00





 


 Pantoprazole


 Sodium


  (Protonix)  40 mg  BIDAC


 PO


   1/22/22 17:30


    1/23/22 07:30





 


 Trazodone HCl


  (Desyrel)  50 mg  HS


 PO


   1/22/22 21:00


    1/22/22 21:47





 


 Cetirizine HCl


  (ZyrTEC)  10 mg  DAILY


 PO


   1/23/22 09:00


    1/23/22 09:00





 


 Folic Acid


  (Folic Acid)  1 mg  DAILY


 PO


   1/23/22 09:00


    1/23/22 09:00





 


 Atorvastatin


 Calcium


  (Lipitor)  40 mg  QHS


 PO


   1/22/22 21:00


    1/22/22 21:46





 


 Sertraline HCl


  (Zoloft)  100 mg  DAILY


 PO


   1/23/22 09:00


    1/23/22 09:00





 


 Verapamil HCl


  (Calan Sr)  240 mg  DAILY


 PO


   1/23/22 09:00


    1/23/22 12:57














Justifications for Admission


Other Justification














LUCIAN EUCEDA MD                  Jan 23, 2022 14:31

## 2022-01-31 NOTE — PDOC3
[facility]-Discharge Summary


Date of Admission:


Date of Admission:  Jan 22, 2022





Date of Discharge:


Date of Discharge:  Jan 23, 2022





Discharge Diagnosis:


Discharge Diagnosis:


Chest pain - likely GI/GERD related. Atypical for chest pain. Had negative LHC 1

year ago


Coronary artery disease status post PCI - patent stents as of 1 year ago


HTN - metoprolol and verapamil. May need to adjust meds


HLD - statin


DM2 - sliding scale insulin. Hold actos for now


Anxiety - on zoloft


History of TIA - stable on ASA, statin


S/p dual chamber pacemaker - interrogated


Diarrhea - possibly with some PUD as well given her symptoms Will place on PPI 

BID, simethicone





Consults:


Consults:


Per cardiology:





ASSESSMENT:


1.  Noncardiac chest pain


2.  Labile blood pressure


3.  Coronary artery disease currently without angina and recent cardiac 

catheterization unremarkable


4.  Tachybradycardia syndrome status post pacemaker, remote, normal function on 

recent evaluation





PLAN:


1.  Continue current medical therapy.  No further cardiovascular testing or 

changes necessary.  Okay to discharge from a cardiac standpoint.  We will 

follow-up with her in the office in discuss any titration of medical therapy as 

needed.





Hospital Course:


Hospital Course:


81-year-old female with PMH CAD status post PCI with JAMES, HTN, HLD, DM2, 

anxiety, prior TIA SSS status post dual-chamber pacemaker who comes to the ED 

today at the behest of her family for progressive chest pain.  Pain began 3 days

ago stabbing sternal area sometimes radiates to her back with some associated 

nausea.  She also has associated diarrhea.  Repositioning and activity do not 

affect it.  She notes that sometimes improves with eating and drinking.  On 

further review she notes that diarrhea has been ongoing for several months and 

she was instructed to take Imodium A-D and feels that it occasionally helps.  No

other significant medication changes.  She takes Tums occasionally for 

heartburn.


WBC 4.1, Hb 10.8, platelets 200 3, K4.2, BUN 32, CR 1.6, glucose 133, LFTs 

within normal laboratory limits, high-sensitivity troponin is 13, NT proBNP is 

13, INR 1, D-dimer minimally elevated at 0.78 chest radiograph with no acute 

abnormality left-sided dual-chamber pacemaker noted


Admitted for further care.





1/23/2022


No acute events overnight.  Patient seen examined bedside.  Patient's blood 

pressures are better controlled but continues to have some labile blood 

pressures.  Measured at 174/64 around 11:00 AM.  We will continue to observe 

today and see if we need to adjust her blood pressure medications any further.  

Patient's chart, labs, images were reviewed and discussed with RN





Patient clinically stable on day of discharge.  Chest pain-free.  Rest of 

hospital course was uneventful





Disposition:


Disposition/Orders:  D/C to Home





Activity:


Activity:


Resume previous activity





Diet:


Diet:  Cardiac





Medications:


Home Meds


Active Scripts


Atorvastatin Calcium (ATORVASTATIN CALCIUM) 40 Mg Tablet, 40 MG PO QHS for 

cholesterol for 30 Days, #30 TAB 2 Refills


   Prov:LUCIAN EUCEDA MD         1/23/22


Pantoprazole Sodium (PANTOPRAZOLE SODIUM  **) 40 Mg Tablet.dr, 40 MG PO BIDAC 

for reflux disease for 30 Days, #60 TAB.SR 2 Refills


   Prov:LUCIAN EUCEDA MD         1/23/22


Dicyclomine Hcl (DICYCLOMINE HCL) 10 Mg Capsule, 10 MG PO PRN TID PRN for abd 

pain for 10 Days, #30 CAP


   Prov:CHET GOMEZ MD         8/14/19


Metoprolol Succinate (METOPROLOL SUCCINATE ( XL )) 100 Mg Tab.er.24h, 100 MG PO 

DAILY, #30 TAB 0 Refills


   Prov:RODRÍGUEZ FENG MD         11/2/16


Reported Medications


Primidone (MYSOLINE) 50 Mg Tablet, 3 TAB PO QHS for TREMORS for 30 Days, #90 TAB

0 Refills


   5/12/21


Loperamide HCl (Imodium A-D) 2 Mg Capsule, 2 MG PO TID PRN PRN for DIARRHEA, CAP


   5/12/21


Omega-3 Fatty Acids/Fish Oil (FISH OIL 1,000 MG SOFTGEL) 1 Each Capsule, 1000 

EACH PO DAILY for SUPPLEMENT, CAP


   5/12/21


Folic Acid (FOLIC ACID) 0.4 Mg Tablet, 0.4 MG PO DAILY for SUPPLEMENT, TAB


   5/12/21


Tramadol Hcl (TRAMADOL HCL) 50 Mg Tablet, 50 MG PO DAILY PRN for PAIN, TAB 0 

Refills


   6/24/19


Benzonatate (TESSALON PERLE) 100 Mg Capsule, 1 CAP PO PRN TID PRN for COUGH, #21

CAP


   6/24/19


Gemfibrozil (LOPID) 600 Mg Tablet, 1 TAB PO BID for high cholestrol , #60 TAB 5 

Refills


   2/27/19


Pioglitazone Hcl (ACTOS) 15 Mg Tablet, 1 TAB PO DAILY for f, #30 TAB 5 Refills


   2/27/19


Lisinopril (LISINOPRIL) 20 Mg Tablet, 1 TAB PO DAILY for HTN, #30 TAB 5 Refills


   2/27/19


Gabapentin (GABAPENTIN **) 300 Mg Capsule, 300 MG PO TID for NEUROGENIC PAIN, 

CAP


   2/27/19


Verapamil Hcl (VERAPAMIL ER) 240 Mg Cap24h.pel, 240 MG PO DAILY, CAP.SR


   10/9/17


Levothyroxine Sodium (LEVOTHYROXINE SODIUM) 112 Mcg Tablet, 112 MCG PO DAILYAC 

for THYROID SUPPLEMENT, #30 TAB 0 Refills


   10/9/17


Albuterol Sulfate (PROAIR HFA INHALER) 8.5 Gm Hfa.aer.ad, 1 PUFF INH PRN Q6HRS 

PRN for SHORTNESS OF BREATH, INHALER 0 Refills


   7/6/15


Trazodone Hcl (TRAZODONE HCL) 50 Mg Tablet, 50 MG PO HS, TAB


   7/6/15


Sertraline Hcl (SERTRALINE HCL) 100 Mg Tablet, 100 MG PO DAILY for ANTI-DEPRES

MALCOLM, TAB 0 Refills


   7/6/15


Ipratropium/Albuterol Sulfate (COMBIVENT RESPIMAT INHAL) 4 Gm Aer.w.adap, 2 INH 

IH QID, INHALER


   7/6/15


Fexofenadine Hcl (ALLEGRA ALLERGY) 60 Mg Tablet, 60 MG PO DAILY, TAB


   7/6/15


Nitroglycerin (NITROSTAT) 0.4 Mg Tab.subl, 1 TAB SL UD, #100 TAB 3 Refills


   7/6/15


Aspirin (ASPIR 81) 81 Mg Tablet.dr, 1 TAB PO DAILY for heart health, #30 TAB 5 

Refills


   7/6/15


Multivitamin (MULTI VITAMIN DAILY) 1 Each Tablet, 1 EACH PO


   7/6/15


Scheduled


Aspirin (Aspir 81), 1 TAB PO DAILY, (Reported)


Atorvastatin Calcium (Atorvastatin Calcium), 40 MG PO QHS


Fexofenadine Hcl (Allegra Allergy), 60 MG PO DAILY, (Reported)


Folic Acid (Folic Acid), 0.4 MG PO DAILY, (Reported)


Gabapentin (Gabapentin **), 300 MG PO TID, (Reported)


Gemfibrozil (Lopid), 1 TAB PO BID, (Reported)


Ipratropium/Albuterol Sulfate (Combivent Respimat Inhal), 2 INH IH QID, 

(Reported)


Levothyroxine Sodium (Levothyroxine Sodium), 112 MCG PO DAILYAC, (Reported)


Lisinopril (Lisinopril), 1 TAB PO DAILY, (Reported)


Metoprolol Succinate (Metoprolol Succinate ( Xl )), 100 MG PO DAILY


Nitroglycerin (Nitrostat), 1 TAB SL UD, (Reported)


Omega-3 Fatty Acids/Fish Oil (Fish Oil 1,000 Mg Softgel), 1,000 EACH PO DAILY, 

(Reported)


Pantoprazole Sodium (Pantoprazole Sodium  **), 40 MG PO BIDAC


Pioglitazone Hcl (Actos), 1 TAB PO DAILY, (Reported)


Primidone (Mysoline), 3 TAB PO QHS, (Reported)


Sertraline Hcl (Sertraline Hcl), 100 MG PO DAILY, (Reported)


Trazodone Hcl (Trazodone Hcl), 50 MG PO HS, (Reported)


Verapamil Hcl (Verapamil Er), 240 MG PO DAILY, (Reported)





Scheduled PRN


Albuterol Sulfate (Proair Hfa Inhaler), 1 PUFF INH PRN Q6HRS PRN for SHORTNESS 

OF BREATH, (Reported)


Benzonatate (Tessalon Perle), 1 CAP PO PRN TID PRN for COUGH, (Reported)


Dicyclomine Hcl (Dicyclomine Hcl), 10 MG PO PRN TID PRN for abd pain


Loperamide HCl (Imodium A-D), 2 MG PO TID PRN PRN for DIARRHEA, (Reported)


Tramadol Hcl (Tramadol Hcl), 50 MG PO DAILY PRN for PAIN, (Reported)





Miscellaneous Medications


Multivitamin (Multi Vitamin Daily), 1 EACH PO, (Reported)





Total Time:


Total Time:


Total time spent was 32 minutes in preparing scripts, discharge planning with 

SWI and RN and preparing this discharge summary





Patient seen and examined on day of discharge.  No acute abnormal findings.





Justicifation of Admission Dx:


Justifications for Admission:


Justification of Admission Dx:  Yes











LUCIAN EUCEDA MD                  Jan 31, 2022 13:02

## 2022-03-02 ENCOUNTER — HOSPITAL ENCOUNTER (INPATIENT)
Dept: HOSPITAL 61 - ER | Age: 82
LOS: 3 days | Discharge: HOME HEALTH | DRG: 291 | End: 2022-03-05
Attending: INTERNAL MEDICINE | Admitting: INTERNAL MEDICINE
Payer: MEDICARE

## 2022-03-02 VITALS — SYSTOLIC BLOOD PRESSURE: 168 MMHG | DIASTOLIC BLOOD PRESSURE: 85 MMHG

## 2022-03-02 VITALS — WEIGHT: 255.96 LBS | HEIGHT: 61.5 IN | BODY MASS INDEX: 47.71 KG/M2

## 2022-03-02 VITALS — DIASTOLIC BLOOD PRESSURE: 77 MMHG | SYSTOLIC BLOOD PRESSURE: 167 MMHG

## 2022-03-02 DIAGNOSIS — Z88.5: ICD-10-CM

## 2022-03-02 DIAGNOSIS — I16.1: ICD-10-CM

## 2022-03-02 DIAGNOSIS — E78.00: ICD-10-CM

## 2022-03-02 DIAGNOSIS — J44.9: ICD-10-CM

## 2022-03-02 DIAGNOSIS — E11.22: ICD-10-CM

## 2022-03-02 DIAGNOSIS — I25.10: ICD-10-CM

## 2022-03-02 DIAGNOSIS — I25.2: ICD-10-CM

## 2022-03-02 DIAGNOSIS — F32.A: ICD-10-CM

## 2022-03-02 DIAGNOSIS — E11.40: ICD-10-CM

## 2022-03-02 DIAGNOSIS — E78.5: ICD-10-CM

## 2022-03-02 DIAGNOSIS — R79.89: ICD-10-CM

## 2022-03-02 DIAGNOSIS — M19.90: ICD-10-CM

## 2022-03-02 DIAGNOSIS — K21.9: ICD-10-CM

## 2022-03-02 DIAGNOSIS — N18.30: ICD-10-CM

## 2022-03-02 DIAGNOSIS — Z20.822: ICD-10-CM

## 2022-03-02 DIAGNOSIS — Z90.49: ICD-10-CM

## 2022-03-02 DIAGNOSIS — I49.5: ICD-10-CM

## 2022-03-02 DIAGNOSIS — Z90.710: ICD-10-CM

## 2022-03-02 DIAGNOSIS — I13.0: Primary | ICD-10-CM

## 2022-03-02 DIAGNOSIS — E66.9: ICD-10-CM

## 2022-03-02 DIAGNOSIS — Z77.22: ICD-10-CM

## 2022-03-02 DIAGNOSIS — Z86.73: ICD-10-CM

## 2022-03-02 DIAGNOSIS — Z88.1: ICD-10-CM

## 2022-03-02 DIAGNOSIS — Z95.0: ICD-10-CM

## 2022-03-02 DIAGNOSIS — Z82.49: ICD-10-CM

## 2022-03-02 DIAGNOSIS — Z95.5: ICD-10-CM

## 2022-03-02 DIAGNOSIS — Z88.8: ICD-10-CM

## 2022-03-02 DIAGNOSIS — F41.9: ICD-10-CM

## 2022-03-02 DIAGNOSIS — E03.9: ICD-10-CM

## 2022-03-02 DIAGNOSIS — I50.43: ICD-10-CM

## 2022-03-02 LAB
ALBUMIN SERPL-MCNC: 3.1 G/DL (ref 3.4–5)
ALBUMIN/GLOB SERPL: 0.7 {RATIO} (ref 1–1.7)
ALP SERPL-CCNC: 88 U/L (ref 46–116)
ALT SERPL-CCNC: 20 U/L (ref 14–59)
ANION GAP SERPL CALC-SCNC: 10 MMOL/L (ref 6–14)
AST SERPL-CCNC: 15 U/L (ref 15–37)
BASOPHILS # BLD AUTO: 0.1 X10^3/UL (ref 0–0.2)
BASOPHILS NFR BLD: 1 % (ref 0–3)
BILIRUB SERPL-MCNC: 0.2 MG/DL (ref 0.2–1)
BUN SERPL-MCNC: 19 MG/DL (ref 7–20)
BUN/CREAT SERPL: 15 (ref 6–20)
CALCIUM SERPL-MCNC: 8.3 MG/DL (ref 8.5–10.1)
CHLORIDE SERPL-SCNC: 104 MMOL/L (ref 98–107)
CO2 SERPL-SCNC: 32 MMOL/L (ref 21–32)
CREAT SERPL-MCNC: 1.3 MG/DL (ref 0.6–1)
EOSINOPHIL NFR BLD: 0.1 X10^3/UL (ref 0–0.7)
EOSINOPHIL NFR BLD: 3 % (ref 0–3)
ERYTHROCYTE [DISTWIDTH] IN BLOOD BY AUTOMATED COUNT: 13.4 % (ref 11.5–14.5)
GFR SERPLBLD BASED ON 1.73 SQ M-ARVRAT: 39.4 ML/MIN
GLUCOSE SERPL-MCNC: 133 MG/DL (ref 70–99)
HCT VFR BLD CALC: 30.6 % (ref 36–47)
HGB BLD-MCNC: 10.1 G/DL (ref 12–15.5)
INFLUENZA A PATIENT: NEGATIVE
INFLUENZA B PATIENT: NEGATIVE
LYMPHOCYTES # BLD: 0.9 X10^3/UL (ref 1–4.8)
LYMPHOCYTES NFR BLD AUTO: 21 % (ref 24–48)
MAGNESIUM SERPL-MCNC: 1.7 MG/DL (ref 1.8–2.4)
MCH RBC QN AUTO: 31 PG (ref 25–35)
MCHC RBC AUTO-ENTMCNC: 33 G/DL (ref 31–37)
MCV RBC AUTO: 95 FL (ref 79–100)
MONO #: 0.3 X10^3/UL (ref 0–1.1)
MONOCYTES NFR BLD: 6 % (ref 0–9)
NEUT #: 3.1 X10^3/UL (ref 1.8–7.7)
NEUTROPHILS NFR BLD AUTO: 69 % (ref 31–73)
PLATELET # BLD AUTO: 213 X10^3/UL (ref 140–400)
POTASSIUM SERPL-SCNC: 4.3 MMOL/L (ref 3.5–5.1)
PROT SERPL-MCNC: 7.3 G/DL (ref 6.4–8.2)
RBC # BLD AUTO: 3.24 X10^6/UL (ref 3.5–5.4)
SODIUM SERPL-SCNC: 146 MMOL/L (ref 136–145)
WBC # BLD AUTO: 4.4 X10^3/UL (ref 4–11)

## 2022-03-02 PROCEDURE — 80048 BASIC METABOLIC PNL TOTAL CA: CPT

## 2022-03-02 PROCEDURE — 93005 ELECTROCARDIOGRAM TRACING: CPT

## 2022-03-02 PROCEDURE — 87186 SC STD MICRODIL/AGAR DIL: CPT

## 2022-03-02 PROCEDURE — 71045 X-RAY EXAM CHEST 1 VIEW: CPT

## 2022-03-02 PROCEDURE — 87077 CULTURE AEROBIC IDENTIFY: CPT

## 2022-03-02 PROCEDURE — 87428 SARSCOV & INF VIR A&B AG IA: CPT

## 2022-03-02 PROCEDURE — G0378 HOSPITAL OBSERVATION PER HR: HCPCS

## 2022-03-02 PROCEDURE — 82962 GLUCOSE BLOOD TEST: CPT

## 2022-03-02 PROCEDURE — 83735 ASSAY OF MAGNESIUM: CPT

## 2022-03-02 PROCEDURE — 80053 COMPREHEN METABOLIC PANEL: CPT

## 2022-03-02 PROCEDURE — 84484 ASSAY OF TROPONIN QUANT: CPT

## 2022-03-02 PROCEDURE — 84100 ASSAY OF PHOSPHORUS: CPT

## 2022-03-02 PROCEDURE — 83880 ASSAY OF NATRIURETIC PEPTIDE: CPT

## 2022-03-02 PROCEDURE — 36415 COLL VENOUS BLD VENIPUNCTURE: CPT

## 2022-03-02 PROCEDURE — 81001 URINALYSIS AUTO W/SCOPE: CPT

## 2022-03-02 PROCEDURE — 87086 URINE CULTURE/COLONY COUNT: CPT

## 2022-03-02 PROCEDURE — 73590 X-RAY EXAM OF LOWER LEG: CPT

## 2022-03-02 PROCEDURE — 85025 COMPLETE CBC W/AUTO DIFF WBC: CPT

## 2022-03-02 RX ADMIN — ENOXAPARIN SODIUM SCH MG: 40 INJECTION SUBCUTANEOUS at 22:47

## 2022-03-02 NOTE — PHYS DOC
Past Medical History


Past Medical History:  Anemia, Anxiety, Bronchitis, CAD, COPD, Depression, 

Diabetes-Type II, High Cholesterol, Hypertension, Hypothyroid, MI, Renal 

Disease, TIA, Other


Additional Past Medical Histor:  COVID-2020,NEUROPATHY


Past Surgical History:  Angioplasty, Appendectomy, Cholecystectomy, 

Hysterectomy, Pacemaker, Tonsillectomy


Additional Past Surgical Histo:  6 cardiac stents,


Smoking Status:  Never Smoker


Alcohol Use:  None


Drug Use:  None





General Adult


HPI:


HPI:





Patient is a 82  year old female who presents with progressive dyspnea for the 

past 1 or 2 weeks.  She describes PND and orthopnea symptoms.  She denies chest 

pain.  She has noticed progressive lower extremity swelling for the several 

months.  She denies cough, wheezing, hemoptysis, fevers or chills.  She had not 

contacted her doctor until today, and her doctor recommended she go to the 

hospital to be seen.  Also, she tripped and fell on the edge of her bed a few 

days ago and she has pain on her anterior left lower extremity.  She requests an

x-ray for this.  She denies any calf pain.  No asymmetric lower extremity 

swelling.  The patient reports that she has a history of COPD, and she still 

believes that she has "smokers lungs."  She has never smoked, but has reportedly

had significant secondhand smoke exposure.  She denies recent travel, surgery, 

hospitalization.  No recent immobilization.





Review of Systems:


Review of Systems:


Constitutional:   Denies fever or chills. []


HENT:   Denies nasal congestion or sore throat. [] 


Respiratory:   Denies cough.  Reports dyspnea, PND, orthopnea and dyspnea with 

exertion


Cardiovascular:   Denies chest pain.  Bilateral lower extremity edema.


GI:   Denies abdominal pain, nausea, vomiting


:  Denies urinary symptoms


Musculoskeletal:   Denies back pain or joint pain.  Reports proximal anterior 

left lower extremity/tibial pain.


Integument:   Denies rash.  Open wounds.


Neurologic:   Denies headache, focal weakness or sensory changes.  Denies 

dizziness or syncope.


Psychiatric:  Denies depression or anxiety. []





Heart Score:


C/O Chest Pain:  No


Risk Factors:


Risk Factors:  DM, Current or recent (<one month) smoker, HTN, HLP, family histo

ry of CAD, obesity.


Risk Scores:


Score 0 - 3:  2.5% MACE over next 6 weeks - Discharge Home


Score 4 - 6:  20.3% MACE over next 6 weeks - Admit for Clinical Observation


Score 7 - 10:  72.7% MACE over next 6 weeks - Early Invasive Strategies





Allergies:


Allergies:





Allergies








Coded Allergies Type Severity Reaction Last Updated Verified


 


  Iodinated Contrast Media Allergy Intermediate FLU SYMPTOMS, NASAL CONGESTION 

10/14/19 Yes


 


  Tetracyclines Allergy Intermediate  10/31/16 Yes


 


  codeine Allergy Intermediate  21 Yes


 


  morphine Allergy Intermediate  10/31/16 Yes


 


  nitrofurantoin Allergy Intermediate  10/31/16 Yes


 


  tetrabenazine Allergy Intermediate  19 Yes











Physical Exam:


PE:





Constitutional: Well developed, well nourished, nontoxic, chronically ill-

appearing female mildly anxious.


HENT: Normocephalic, atraumatic


Eyes: There are clear and anicteric, conjunctive a are clear


Neck: Normal range of motion, no tenderness, supple, no stridor.  Trachea 

midline.


Cardiovascular:Heart rate regular rhythm, 2 radial and +2 posterior tibial 

pulses bilaterally


Lungs & Thorax: Diminished breath sounds in bilateral bases, fine rales 

bilateral bases.  Mild tachypnea.  No retractions.  No wheezing, no stridor.  No

 cyanosis.


Abdomen: Abdomen is obese, soft, nondistended, nontender to palpation.


Skin: Warm, dry, no erythema, no rash. [] 


Back: No tenderness, no CVA tenderness. [] 


Extremities: Bilateral, symmetric lower extremity +2 pitting edema.  No calf 

tenderness.  No evidence of deformity.  Mild left anterior tibial tenderness to 

palpation.  Negative Homans.  No palpable cord.  No warmth erythema


Neurologic: Alert and oriented X 3, normal motor function, normal sensory fun

ction, no focal deficits noted. []


Psychologic: Affect normal, judgement normal, mood normal.  She is pleasant and 

cooperative.





EKG:


EKG:


EKG is interpreted at 1552


Rhythm is sinus


Rate is 75 bpm


Axis is left


artifact


No obvious acute ischemia


No STEMI


Q waves V1, V2, V3, V4, V5, III, aVF





Radiology/Procedures:


Radiology/Procedures:


                                        


                                 IMAGING REPORT





                                     Signed





PATIENT: CHRISTIANO BUSTILLO    ACCOUNT: [id number]     MRN#: [id number]


: 1940           LOCATION: ER              AGE: 82


SEX: F                    EXAM DT: 22         ACCESSION#: [id number]


STATUS: REG ER            ORD. PHYSICIAN: CINDY ALMONTE DO


REASON: dyspnea


PROCEDURE: PORTABLE CHEST 1V





AP chest.





HISTORY: Dyspnea





AP view was taken of the chest. There is a left pacemaker with atrial 

ventricular pacing leads. There are no confluent infiltrates. There is mild 

vascular congestion.





IMPRESSION:


1. Heart upper normal in size.


2. Mild vascular congestion.


3. No confluent infiltrates.





Electronically signed by: Sachin Neely MD (3/2/2022 4:43 PM) [facility]














DICTATED and SIGNED BY:     SACHIN NEELY MD


DATE:     22MTH0 0











                                 IMAGING REPORT





                                     Signed





PATIENT: CHRISTIANO BUSTILLO    ACCOUNT: [id number]     MRN#: [id number]


: 1940           LOCATION: ER              AGE: 82


SEX: F                    EXAM DT: 22         ACCESSION#: [id number]


STATUS: REG ER            ORD. PHYSICIAN: CINDY ALMONTE DO


REASON: fall


PROCEDURE: TIBIA FIBULA LEFT





Exam: XR LT TIBIA + FIBULA





History: Fall





Comparison: None.





Findings:


Osseous mineralization is normal. No acute fracture or dislocaton. Degenerative 

changes of the knee with joint space narrowing and marginal osteophyte 

formation. No focal soft tissue swelling.





Impression: 


1.  No acute osseous abnormality in the tibia and fibula.





Electronically signed by: Sonido Dominguez MD (3/2/2022 4:45 PM) [id number]














DICTATED and SIGNED BY:     SONIDO DOMINGUEZ MD


DATE:     22MTH0 0





Course & Med Decision Making:


Course & Med Decision Making


Pertinent Labs and Imaging studies reviewed. (See chart for details)





The patient was significantly hypertensive.  She is given Nitropaste and oral 

aspirin.  IV Lasix ordered.  She is resting very comfortably.  She reports 

improvement in dyspnea.  She is manifesting no evidence of acute respiratory 

distress or hypoxia.  I have discussed the findings, differential diagnosis and 

plan of care with her.  I have recommended hospitalization and cardiology 

consultation.  She declines any pain medication for her left lower extremity 

pain.  No fracture noted on x-ray.  She is comfortable with the plan for 

admission.  She is excepted for admission by Dr. Glover.





Cheryle Disclaimer:


Cheryle Disclaimer:


This electronic medical record was generated, in whole or in part, using a voice

 recognition dictation system.





Departure


Departure


Impression:  


   Primary Impression:  


   Congestive heart failure


   


   Additional Impression:  


   Hypertensive emergency


Disposition:   ADMITTED AS INPATIENT


Admitting Physician:  CHANI (Dr. Glover)


Condition:  GUARDED


Referrals:  


DANIELLA LINTON MD (PCP)











CINDY ALMONTE DO              Mar 2, 2022 15:54

## 2022-03-02 NOTE — PDOC1
History and Physical


Date of Service:


DOS:


DATE: 3/2/22 


TIME: 19:04





Chief Complaint:


Chief Complain:


Shortness of breath





History of Present Illness:


HPI:


82-year-old female with past medical history of CAD, COPD, depression, diabetes 

mellitus type 2, hypothyroidism, and diastolic CHF comes in with shortness of 

breath.  Patient states she has been compliant with her all her medications.  

Unfortunately she has not been noncompliant with her diet and she has likely eat

a lot of salty foods.  She lives with her 2 sons that mainly cooks and buys food

for her.  Denies any lower extremity swelling.  Denies fevers, chest pain, 

palpitations or syncope.  Patient does endorse orthopnea at night.





Past Medical/Surgical History:


PMH/PSH:


Past Medical History:  Anemia, Anxiety, Bronchitis, CAD, COPD, Depression, 

Diabetes-Type II, High Cholesterol, Hypertension, Hypothyroid, MI, Renal 

Disease, TIA, COVID-19/NOV 2020,NEUROPATHY


Past Surgical History:  Angioplasty, Appendectomy, Cholecystectomy, 

Hysterectomy, Pacemaker, Tonsillectomy, 6 cardiac stents,





Allergies:


Allergies:  


Coded Allergies:  


     Iodinated Contrast Media (Verified  Allergy, Intermediate, FLU SYMPTOMS, 

NASAL CONGESTION, 3/2/22)


   REACTION WAS 40 YEARS AGO.  NO ALLERGY TO BETADINE, OK WITH


   BENADRYL PRIOR


     Tetracyclines (Verified  Allergy, Intermediate, 3/2/22)


     codeine (Verified  Allergy, Intermediate, 3/2/22)


     nitrofurantoin (Verified  Allergy, Intermediate, 3/2/22)


     tetrabenazine (Verified  Allergy, Intermediate, 3/2/22)


     morphine (Verified  Adverse Reaction, Intermediate, HALLUCINATIONS, 3/2/22)





Family History:


Family History:


Reviewed with no relative findings in the chart





Social History:


Social History:


Smoking Status:  Never Smoker


Alcohol Use:  None


Drug Use:  None





Current Medications:


Current Medications





Current Medications


Nitroglycerin (Nitro-Bid Oint) 1 inch 1X  ONCE TP  Last administered on 3/2/22at

16:15;  Start 3/2/22 at 16:15;  Stop 3/2/22 at 16:16;  Status DC


Aspirin (Ecotrin) 325 mg 1X  ONCE PO  Last administered on 3/2/22at 16:15;  

Start 3/2/22 at 16:15;  Stop 3/2/22 at 16:16;  Status DC





Active Scripts


Active


Atorvastatin Calcium 40 Mg Tablet 40 Mg PO QHS 30 Days


Pantoprazole Sodium  ** (Pantoprazole Sodium) 40 Mg Tablet.dr 40 Mg PO BIDAC 30 

Days


Dicyclomine Hcl 10 Mg Capsule 10 Mg PO PRN TID PRN 10 Days


Metoprolol Succinate ( Xl ) (Metoprolol Succinate) 100 Mg Tab.er.24h 100 Mg PO 

DAILY


Reported


Mysoline (Primidone) 50 Mg Tablet 3 Tab PO QHS 30 Days


Imodium A-D (Loperamide HCl) 2 Mg Capsule 2 Mg PO TID PRN PRN


Fish Oil 1,000 Mg Softgel (Omega-3 Fatty Acids/Fish Oil) 1 Each Capsule 1,000 

Each PO DAILY


Folic Acid 0.4 Mg Tablet 0.4 Mg PO DAILY


Tramadol Hcl 50 Mg Tablet 50 Mg PO DAILY PRN


Tessalon Perle (Benzonatate) 100 Mg Capsule 1 Cap PO PRN TID PRN


Lopid (Gemfibrozil) 600 Mg Tablet 1 Tab PO BID


Actos (Pioglitazone Hcl) 15 Mg Tablet 1 Tab PO DAILY


Lisinopril 20 Mg Tablet 1 Tab PO DAILY


Gabapentin ** (Gabapentin) 300 Mg Capsule 300 Mg PO TID


Verapamil Er (Verapamil Hcl) 240 Mg Cap24h.pel 240 Mg PO DAILY


Levothyroxine Sodium 112 Mcg Tablet 112 Mcg PO DAILYAC


Proair Hfa Inhaler (Albuterol Sulfate) 8.5 Gm Hfa.aer.ad 1 Puff INH PRN Q6HRS 

PRN


Trazodone Hcl 50 Mg Tablet 50 Mg PO HS


Sertraline Hcl 100 Mg Tablet 100 Mg PO DAILY


Combivent Respimat Inhal (Ipratropium/Albuterol Sulfate) 4 Gm Aer.w.adap 2 Inh 

IH QID


Allegra Allergy (Fexofenadine Hcl) 60 Mg Tablet 60 Mg PO DAILY


Nitrostat (Nitroglycerin) 0.4 Mg Tab.subl 1 Tab SL UD


Aspir 81 (Aspirin) 81 Mg Tablet.dr 1 Tab PO DAILY


Multi Vitamin Daily (Multivitamin) 1 Each Tablet 1 Each PO





ROS:


Review of Systems


Review of System


REVIEW OF SYSTEMS:


GENERAL:  Denies weakness


SKIN:  No bruising, hair changes or rashes.


EYES:  No blurred, double or loss of vision.


NOSE AND THROAT:  No history of nosebleeds, hoarseness or sore throat.


HEART:  No history of palpitations, chest pain or shortness of breath on


exertion.


LUNGS:  Denies cough, hemoptysis, wheezing or shortness of breath.


GASTROINTESTINAL:  Denies changes in appetite, nausea, vomiting, diarrhea or


constipation.


GENITOURINARY:  No history of frequency, urgency, hesitancy or nocturia.


NEUROLOGIC:  Denies history of numbness, tingling, or tremor.


PSYCHIATRIC:  No history of panic, anxiety or depression.


ENDOCRINE:  No history of heat or cold intolerance, polyuria or polydipsia.


EXTREMITIES:  Denies joint pain, pain on walking or stiffness.





Physical Exam:


Vital Signs:





Vital Signs








  Date Time  Temp Pulse Resp B/P (MAP) Pulse Ox O2 Delivery O2 Flow Rate FiO2


 


3/2/22 18:24  64 17 189/83 (118) 97 Room Air  


 


3/2/22 15:44 98.3       





 98.3       








Physcial Exam:


General: Well developed, well nourished, no acute distress, well appearing


HEENT:  Pupils equally round and reactive to light, EOMI, no discharge, normal 

conjunctiva


Neck:  Supple, no nuchal rigidity, no JVD, trachea midline, no tenderness


Cardiac:  RRR, no murmurs, no gallops, no rubs


Chest/Lungs:  CTAB, no wheeze, no rhonchi, no crackles


Abdomen: soft, non-distended, no guarding, no peritoneal signs, non-tender 


Back:  No tenderness


Extremities:  no edema, pulses intact, non-tender,capillary refill <3 sec 

bilateral upper and lower extremities,


Neuro:  Alert and oriented x 4, no focal deficits, normal speech





Labs:


Labs:





Laboratory Tests








Test


 3/2/22


16:00 3/2/22


16:50


 


White Blood Count


 4.4 x10^3/uL


(4.0-11.0) 





 


Red Blood Count


 3.24 x10^6/uL


(3.50-5.40) 





 


Hemoglobin


 10.1 g/dL


(12.0-15.5) 





 


Hematocrit


 30.6 %


(36.0-47.0) 





 


Mean Corpuscular Volume 95 fL ()  


 


Mean Corpuscular Hemoglobin 31 pg (25-35)  


 


Mean Corpuscular Hemoglobin


Concent 33 g/dL


(31-37) 





 


Red Cell Distribution Width


 13.4 %


(11.5-14.5) 





 


Platelet Count


 213 x10^3/uL


(140-400) 





 


Neutrophils (%) (Auto) 69 % (31-73)  


 


Lymphocytes (%) (Auto) 21 % (24-48)  


 


Monocytes (%) (Auto) 6 % (0-9)  


 


Eosinophils (%) (Auto) 3 % (0-3)  


 


Basophils (%) (Auto) 1 % (0-3)  


 


Neutrophils # (Auto)


 3.1 x10^3/uL


(1.8-7.7) 





 


Lymphocytes # (Auto)


 0.9 x10^3/uL


(1.0-4.8) 





 


Monocytes # (Auto)


 0.3 x10^3/uL


(0.0-1.1) 





 


Eosinophils # (Auto)


 0.1 x10^3/uL


(0.0-0.7) 





 


Basophils # (Auto)


 0.1 x10^3/uL


(0.0-0.2) 





 


Sodium Level


 146 mmol/L


(136-145) 





 


Potassium Level


 4.3 mmol/L


(3.5-5.1) 





 


Chloride Level


 104 mmol/L


() 





 


Carbon Dioxide Level


 32 mmol/L


(21-32) 





 


Anion Gap 10 (6-14)  


 


Blood Urea Nitrogen


 19 mg/dL


(7-20) 





 


Creatinine


 1.3 mg/dL


(0.6-1.0) 





 


Estimated GFR


(Cockcroft-Gault) 39.4 


 





 


BUN/Creatinine Ratio 15 (6-20)  


 


Glucose Level


 133 mg/dL


(70-99) 





 


Calcium Level


 8.3 mg/dL


(8.5-10.1) 





 


Magnesium Level


 1.7 mg/dL


(1.8-2.4) 





 


Total Bilirubin


 0.2 mg/dL


(0.2-1.0) 





 


Aspartate Amino Transf


(AST/SGOT) 15 U/L (15-37) 


 





 


Alanine Aminotransferase


(ALT/SGPT) 20 U/L (14-59) 


 





 


Alkaline Phosphatase


 88 U/L


() 





 


Troponin I High Sensitivity 14 ng/L (4-50)  


 


NT-Pro-B-Type Natriuretic


Peptide 1245 pg/mL


(0-449) 





 


Total Protein


 7.3 g/dL


(6.4-8.2) 





 


Albumin


 3.1 g/dL


(3.4-5.0) 





 


Albumin/Globulin Ratio 0.7 (1.0-1.7)  


 


Influenza Type A Antigen


 


 Negative


(NEGATIVE)


 


Influenza Type B Antigen


 


 Negative


(NEGATIVE)


 


SARS-CoV-2 Antigen (Rapid)


 


 Negative


(NEGATIVE)








Laboratory Tests








Test


 3/2/22


16:00 3/2/22


16:50


 


White Blood Count


 4.4 x10^3/uL


(4.0-11.0) 





 


Red Blood Count


 3.24 x10^6/uL


(3.50-5.40) 





 


Hemoglobin


 10.1 g/dL


(12.0-15.5) 





 


Hematocrit


 30.6 %


(36.0-47.0) 





 


Mean Corpuscular Volume 95 fL ()  


 


Mean Corpuscular Hemoglobin 31 pg (25-35)  


 


Mean Corpuscular Hemoglobin


Concent 33 g/dL


(31-37) 





 


Red Cell Distribution Width


 13.4 %


(11.5-14.5) 





 


Platelet Count


 213 x10^3/uL


(140-400) 





 


Neutrophils (%) (Auto) 69 % (31-73)  


 


Lymphocytes (%) (Auto) 21 % (24-48)  


 


Monocytes (%) (Auto) 6 % (0-9)  


 


Eosinophils (%) (Auto) 3 % (0-3)  


 


Basophils (%) (Auto) 1 % (0-3)  


 


Neutrophils # (Auto)


 3.1 x10^3/uL


(1.8-7.7) 





 


Lymphocytes # (Auto)


 0.9 x10^3/uL


(1.0-4.8) 





 


Monocytes # (Auto)


 0.3 x10^3/uL


(0.0-1.1) 





 


Eosinophils # (Auto)


 0.1 x10^3/uL


(0.0-0.7) 





 


Basophils # (Auto)


 0.1 x10^3/uL


(0.0-0.2) 





 


Sodium Level


 146 mmol/L


(136-145) 





 


Potassium Level


 4.3 mmol/L


(3.5-5.1) 





 


Chloride Level


 104 mmol/L


() 





 


Carbon Dioxide Level


 32 mmol/L


(21-32) 





 


Anion Gap 10 (6-14)  


 


Blood Urea Nitrogen


 19 mg/dL


(7-20) 





 


Creatinine


 1.3 mg/dL


(0.6-1.0) 





 


Estimated GFR


(Cockcroft-Gault) 39.4 


 





 


BUN/Creatinine Ratio 15 (6-20)  


 


Glucose Level


 133 mg/dL


(70-99) 





 


Calcium Level


 8.3 mg/dL


(8.5-10.1) 





 


Magnesium Level


 1.7 mg/dL


(1.8-2.4) 





 


Total Bilirubin


 0.2 mg/dL


(0.2-1.0) 





 


Aspartate Amino Transf


(AST/SGOT) 15 U/L (15-37) 


 





 


Alanine Aminotransferase


(ALT/SGPT) 20 U/L (14-59) 


 





 


Alkaline Phosphatase


 88 U/L


() 





 


Troponin I High Sensitivity 14 ng/L (4-50)  


 


NT-Pro-B-Type Natriuretic


Peptide 1245 pg/mL


(0-449) 





 


Total Protein


 7.3 g/dL


(6.4-8.2) 





 


Albumin


 3.1 g/dL


(3.4-5.0) 





 


Albumin/Globulin Ratio 0.7 (1.0-1.7)  


 


Influenza Type A Antigen


 


 Negative


(NEGATIVE)


 


Influenza Type B Antigen


 


 Negative


(NEGATIVE)


 


SARS-CoV-2 Antigen (Rapid)


 


 Negative


(NEGATIVE)











Images:


Images


PROCEDURE: TIBIA FIBULA LEFT





Exam: XR LT TIBIA + FIBULA





History: Fall





Comparison: None.





Findings:


Osseous mineralization is normal. No acute fracture or dislocaton. Degenerative 

changes of the knee with joint space narrowing and marginal osteophyte 

formation. No focal soft tissue swelling.





Impression: 


1.  No acute osseous abnormality in the tibia and fibula.





PROCEDURE: PORTABLE CHEST 1V





AP chest.





HISTORY: Dyspnea





AP view was taken of the chest. There is a left pacemaker with atrial 

ventricular pacing leads. There are no confluent infiltrates. There is mild 

vascular congestion.





IMPRESSION:


1. Heart upper normal in size.


2. Mild vascular congestion.


3. No confluent infiltrates.





Assessment/Plan


Assessment/Plan


Hypertensive urgency


Acute on chronic CHF exacerbation


Elevated BNP likely due to volume overload


History of diastolic CHF


Coronary artery disease status post PCI - patent stents as of 1 year ago


HTN - metoprolol and verapamil. May need to adjust meds


HLD - statin


DM2 - sliding scale insulin. Hold actos for now


Anxiety - on zoloft


History of TIA - stable on ASA, statin


S/p dual chamber pacemaker - interrogated





Admit to hospitalist service for further management


Continue telemetry monitoring


IV antihypertensive regimen to maintain systolic blood pressure between [id number]


Strict I/O


Monitor urine output


Sodium and fluid restriction


Encourage DASH diet


IV Lasix daily as needed


Cardiology consult


Resume cardioprotective medications


Heparin for DVT prophylaxis


Protonix GI prophylaxis


ADA diet


CODE STATUS [full


Discussed with RN and SW


Disposition inpatient management as above


DPOA: Son





Justifications for Admission


Other Justification














LUCIAN EUCEDA MD                   Mar 2, 2022 19:23

## 2022-03-02 NOTE — RAD
AP chest.



HISTORY: Dyspnea



AP view was taken of the chest. There is a left pacemaker with atrial ventricular pacing leads. There
 are no confluent infiltrates. There is mild vascular congestion.



IMPRESSION:

1. Heart upper normal in size.

2. Mild vascular congestion.

3. No confluent infiltrates.



Electronically signed by: Sachin Neely MD (3/2/2022 4:43 PM) [facility]

## 2022-03-02 NOTE — RAD
Exam: XR LT TIBIA + FIBULA



History: Fall



Comparison: None.



Findings:

Osseous mineralization is normal. No acute fracture or dislocaton. Degenerative changes of the knee w
ith joint space narrowing and marginal osteophyte formation. No focal soft tissue swelling.



Impression: 

1.  No acute osseous abnormality in the tibia and fibula.



Electronically signed by: Sonido Hancock MD (3/2/2022 4:45 PM) [id number]

## 2022-03-03 VITALS — SYSTOLIC BLOOD PRESSURE: 153 MMHG | DIASTOLIC BLOOD PRESSURE: 60 MMHG

## 2022-03-03 VITALS — DIASTOLIC BLOOD PRESSURE: 70 MMHG | SYSTOLIC BLOOD PRESSURE: 166 MMHG

## 2022-03-03 VITALS — SYSTOLIC BLOOD PRESSURE: 165 MMHG | DIASTOLIC BLOOD PRESSURE: 76 MMHG

## 2022-03-03 VITALS — DIASTOLIC BLOOD PRESSURE: 62 MMHG | SYSTOLIC BLOOD PRESSURE: 155 MMHG

## 2022-03-03 VITALS — SYSTOLIC BLOOD PRESSURE: 165 MMHG | DIASTOLIC BLOOD PRESSURE: 67 MMHG

## 2022-03-03 VITALS — DIASTOLIC BLOOD PRESSURE: 72 MMHG | SYSTOLIC BLOOD PRESSURE: 169 MMHG

## 2022-03-03 LAB
ANION GAP SERPL CALC-SCNC: 9 MMOL/L (ref 6–14)
APTT PPP: (no result) S
BACTERIA #/AREA URNS HPF: 0 /HPF
BASOPHILS # BLD AUTO: 0 X10^3/UL (ref 0–0.2)
BASOPHILS NFR BLD: 1 % (ref 0–3)
BILIRUB UR QL STRIP: NEGATIVE
BUN SERPL-MCNC: 18 MG/DL (ref 7–20)
CALCIUM SERPL-MCNC: 8.5 MG/DL (ref 8.5–10.1)
CHLORIDE SERPL-SCNC: 104 MMOL/L (ref 98–107)
CO2 SERPL-SCNC: 33 MMOL/L (ref 21–32)
CREAT SERPL-MCNC: 1.3 MG/DL (ref 0.6–1)
EOSINOPHIL NFR BLD: 0.1 X10^3/UL (ref 0–0.7)
EOSINOPHIL NFR BLD: 4 % (ref 0–3)
ERYTHROCYTE [DISTWIDTH] IN BLOOD BY AUTOMATED COUNT: 13.7 % (ref 11.5–14.5)
FIBRINOGEN PPP-MCNC: CLEAR MG/DL
GFR SERPLBLD BASED ON 1.73 SQ M-ARVRAT: 39.4 ML/MIN
GLUCOSE SERPL-MCNC: 106 MG/DL (ref 70–99)
HCT VFR BLD CALC: 28.1 % (ref 36–47)
HGB BLD-MCNC: 9.1 G/DL (ref 12–15.5)
LYMPHOCYTES # BLD: 1.1 X10^3/UL (ref 1–4.8)
LYMPHOCYTES NFR BLD AUTO: 27 % (ref 24–48)
MAGNESIUM SERPL-MCNC: 1.8 MG/DL (ref 1.8–2.4)
MCH RBC QN AUTO: 31 PG (ref 25–35)
MCHC RBC AUTO-ENTMCNC: 32 G/DL (ref 31–37)
MCV RBC AUTO: 96 FL (ref 79–100)
MONO #: 0.3 X10^3/UL (ref 0–1.1)
MONOCYTES NFR BLD: 7 % (ref 0–9)
NEUT #: 2.6 X10^3/UL (ref 1.8–7.7)
NEUTROPHILS NFR BLD AUTO: 62 % (ref 31–73)
NITRITE UR QL STRIP: NEGATIVE
PH UR STRIP: 6 [PH]
PHOSPHATE SERPL-MCNC: 3.8 MG/DL (ref 2.6–4.7)
PLATELET # BLD AUTO: 181 X10^3/UL (ref 140–400)
POTASSIUM SERPL-SCNC: 3.6 MMOL/L (ref 3.5–5.1)
PROT UR STRIP-MCNC: NEGATIVE MG/DL
RBC # BLD AUTO: 2.94 X10^6/UL (ref 3.5–5.4)
RBC #/AREA URNS HPF: 0 /HPF (ref 0–2)
SODIUM SERPL-SCNC: 146 MMOL/L (ref 136–145)
UROBILINOGEN UR-MCNC: 0.2 MG/DL
WBC # BLD AUTO: 4.2 X10^3/UL (ref 4–11)
WBC #/AREA URNS HPF: (no result) /HPF (ref 0–4)

## 2022-03-03 RX ADMIN — ASPIRIN SCH MG: 81 TABLET, COATED ORAL at 21:25

## 2022-03-03 RX ADMIN — VERAPAMIL HYDROCHLORIDE SCH MG: 120 TABLET, FILM COATED, EXTENDED RELEASE ORAL at 08:39

## 2022-03-03 RX ADMIN — INSULIN LISPRO SCH UNITS: 100 INJECTION, SOLUTION INTRAVENOUS; SUBCUTANEOUS at 17:00

## 2022-03-03 RX ADMIN — LEVOTHYROXINE SODIUM SCH MCG: 0.11 TABLET ORAL at 07:30

## 2022-03-03 RX ADMIN — FOLIC ACID SCH MG: 1 TABLET ORAL at 08:39

## 2022-03-03 RX ADMIN — METOPROLOL SUCCINATE SCH MG: 100 TABLET, FILM COATED, EXTENDED RELEASE ORAL at 08:40

## 2022-03-03 RX ADMIN — LISINOPRIL SCH MG: 20 TABLET ORAL at 08:39

## 2022-03-03 RX ADMIN — ASPIRIN SCH MG: 81 TABLET, COATED ORAL at 00:05

## 2022-03-03 RX ADMIN — INSULIN LISPRO SCH UNITS: 100 INJECTION, SOLUTION INTRAVENOUS; SUBCUTANEOUS at 12:00

## 2022-03-03 RX ADMIN — INSULIN LISPRO SCH UNITS: 100 INJECTION, SOLUTION INTRAVENOUS; SUBCUTANEOUS at 08:00

## 2022-03-03 RX ADMIN — GABAPENTIN SCH MG: 300 CAPSULE ORAL at 13:43

## 2022-03-03 RX ADMIN — GABAPENTIN SCH MG: 300 CAPSULE ORAL at 00:06

## 2022-03-03 RX ADMIN — TRAZODONE HYDROCHLORIDE SCH MG: 50 TABLET ORAL at 00:06

## 2022-03-03 RX ADMIN — Medication SCH MG: at 21:28

## 2022-03-03 RX ADMIN — ENOXAPARIN SODIUM SCH MG: 40 INJECTION SUBCUTANEOUS at 21:25

## 2022-03-03 RX ADMIN — ATORVASTATIN CALCIUM SCH MG: 40 TABLET, FILM COATED ORAL at 21:25

## 2022-03-03 RX ADMIN — PRIMIDONE SCH MG: 50 TABLET ORAL at 21:26

## 2022-03-03 RX ADMIN — ACETAMINOPHEN PRN MG: 325 TABLET, FILM COATED ORAL at 00:08

## 2022-03-03 RX ADMIN — PRIMIDONE SCH MG: 50 TABLET ORAL at 00:05

## 2022-03-03 RX ADMIN — SERTRALINE HYDROCHLORIDE SCH MG: 50 TABLET ORAL at 08:40

## 2022-03-03 RX ADMIN — ACETAMINOPHEN PRN MG: 325 TABLET, FILM COATED ORAL at 21:33

## 2022-03-03 RX ADMIN — GABAPENTIN SCH MG: 300 CAPSULE ORAL at 21:25

## 2022-03-03 RX ADMIN — GABAPENTIN SCH MG: 300 CAPSULE ORAL at 08:39

## 2022-03-03 RX ADMIN — TRAZODONE HYDROCHLORIDE SCH MG: 50 TABLET ORAL at 21:25

## 2022-03-03 RX ADMIN — ATORVASTATIN CALCIUM SCH MG: 40 TABLET, FILM COATED ORAL at 00:05

## 2022-03-03 NOTE — PDOC
[employer] PROGRESS NOTE


Date of Service


DOS:


DATE: 3/3/22 


TIME: 14:01





Chief Complaint


Chief Complaint


Assessment/Plan


Hypertensive urgency


Acute on chronic CHF exacerbation


Elevated BNP likely due to volume overload


History of diastolic CHF


Coronary artery disease status post PCI - patent stents as of 1 year ago


HTN - metoprolol and verapamil. May need to adjust meds


HLD - statin


DM2 - sliding scale insulin. Hold actos for now


Anxiety - on zoloft


History of TIA - stable on ASA, statin


S/p dual chamber pacemaker - interrogated





Admit to hospitalist service for further management


Continue telemetry monitoring


IV antihypertensive regimen to maintain systolic blood pressure between [id number]


Strict I/O


Monitor urine output


Sodium and fluid restriction


Encourage DASH diet


IV Lasix daily as needed


Cardiology consult


Resume cardioprotective medications


Heparin for DVT prophylaxis


Protonix GI prophylaxis


ADA diet


CODE STATUS [full


Discussed with RN and SW


Disposition inpatient management as above


DPOA: Son





History of Present Illness


History of Present Illness


82-year-old female with past medical history of CAD, COPD, depression, diabetes 

mellitus type 2, hypothyroidism, and diastolic CHF comes in with shortness of 

breath.  Patient states she has been compliant with her all her medications.  

Unfortunately she has not been noncompliant with her diet and she has likely eat

 a lot of salty foods.  She lives with her 2 sons that mainly cooks and buys 

food for her.  Denies any lower extremity swelling.  Denies fevers, chest pain, 

palpitations or syncope.  Patient does endorse orthopnea at night.





3/3/2022


No acute events overnight.  Patient seen examined bedside.  Blood pressures are 

better controlled.  Afebrile.





Vitals/I&O


Vitals/I&O:





                                   Vital Signs








  Date Time  Temp Pulse Resp B/P (MAP) Pulse Ox O2 Delivery O2 Flow Rate FiO2


 


3/3/22 11:00 97.9 66 18 165/67 (99) 94   





 97.9       


 


3/3/22 07:00      Room Air  











Physical Exam


General:  Alert, Oriented X3, Cooperative, No acute distress


Heart:  Regular rate (Sr with intermittent pacing), Normal S1, Normal S2, Other 

(distant heart sounds)


Lungs:  Clear


Abdomen:  Soft, No tenderness, Other (obese)


Extremities:  No cyanosis, Other (1+ bilateral LE pitting edema)


Skin:  No breakdown, No significant lesion





Labs


Labs:





Laboratory Tests








Test


 3/2/22


16:00 3/2/22


16:50 3/3/22


06:30 3/3/22


07:00


 


White Blood Count


 4.4 x10^3/uL


(4.0-11.0) 


 


 4.2 x10^3/uL


(4.0-11.0)


 


Red Blood Count


 3.24 x10^6/uL


(3.50-5.40) 


 


 2.94 x10^6/uL


(3.50-5.40)


 


Hemoglobin


 10.1 g/dL


(12.0-15.5) 


 


 9.1 g/dL


(12.0-15.5)


 


Hematocrit


 30.6 %


(36.0-47.0) 


 


 28.1 %


(36.0-47.0)


 


Mean Corpuscular Volume 95 fL ()    96 fL () 


 


Mean Corpuscular Hemoglobin 31 pg (25-35)    31 pg (25-35) 


 


Mean Corpuscular Hemoglobin


Concent 33 g/dL


(31-37) 


 


 32 g/dL


(31-37)


 


Red Cell Distribution Width


 13.4 %


(11.5-14.5) 


 


 13.7 %


(11.5-14.5)


 


Platelet Count


 213 x10^3/uL


(140-400) 


 


 181 x10^3/uL


(140-400)


 


Neutrophils (%) (Auto) 69 % (31-73)    62 % (31-73) 


 


Lymphocytes (%) (Auto) 21 % (24-48)    27 % (24-48) 


 


Monocytes (%) (Auto) 6 % (0-9)    7 % (0-9) 


 


Eosinophils (%) (Auto) 3 % (0-3)    4 % (0-3) 


 


Basophils (%) (Auto) 1 % (0-3)    1 % (0-3) 


 


Neutrophils # (Auto)


 3.1 x10^3/uL


(1.8-7.7) 


 


 2.6 x10^3/uL


(1.8-7.7)


 


Lymphocytes # (Auto)


 0.9 x10^3/uL


(1.0-4.8) 


 


 1.1 x10^3/uL


(1.0-4.8)


 


Monocytes # (Auto)


 0.3 x10^3/uL


(0.0-1.1) 


 


 0.3 x10^3/uL


(0.0-1.1)


 


Eosinophils # (Auto)


 0.1 x10^3/uL


(0.0-0.7) 


 


 0.1 x10^3/uL


(0.0-0.7)


 


Basophils # (Auto)


 0.1 x10^3/uL


(0.0-0.2) 


 


 0.0 x10^3/uL


(0.0-0.2)


 


Sodium Level


 146 mmol/L


(136-145) 


 


 146 mmol/L


(136-145)


 


Potassium Level


 4.3 mmol/L


(3.5-5.1) 


 


 3.6 mmol/L


(3.5-5.1)


 


Chloride Level


 104 mmol/L


() 


 


 104 mmol/L


()


 


Carbon Dioxide Level


 32 mmol/L


(21-32) 


 


 33 mmol/L


(21-32)


 


Anion Gap 10 (6-14)    9 (6-14) 


 


Blood Urea Nitrogen


 19 mg/dL


(7-20) 


 


 18 mg/dL


(7-20)


 


Creatinine


 1.3 mg/dL


(0.6-1.0) 


 


 1.3 mg/dL


(0.6-1.0)


 


Estimated GFR


(Cockcroft-Gault) 39.4 


 


 


 39.4 





 


BUN/Creatinine Ratio 15 (6-20)    


 


Glucose Level


 133 mg/dL


(70-99) 


 


 106 mg/dL


(70-99)


 


Calcium Level


 8.3 mg/dL


(8.5-10.1) 


 


 8.5 mg/dL


(8.5-10.1)


 


Magnesium Level


 1.7 mg/dL


(1.8-2.4) 


 


 1.8 mg/dL


(1.8-2.4)


 


Total Bilirubin


 0.2 mg/dL


(0.2-1.0) 


 


 





 


Aspartate Amino Transf


(AST/SGOT) 15 U/L (15-37) 


 


 


 





 


Alanine Aminotransferase


(ALT/SGPT) 20 U/L (14-59) 


 


 


 





 


Alkaline Phosphatase


 88 U/L


() 


 


 





 


Troponin I High Sensitivity 14 ng/L (4-50)    


 


NT-Pro-B-Type Natriuretic


Peptide 1245 pg/mL


(0-449) 


 


 





 


Total Protein


 7.3 g/dL


(6.4-8.2) 


 


 





 


Albumin


 3.1 g/dL


(3.4-5.0) 


 


 





 


Albumin/Globulin Ratio 0.7 (1.0-1.7)    


 


Influenza Type A Antigen


 


 Negative


(NEGATIVE) 


 





 


Influenza Type B Antigen


 


 Negative


(NEGATIVE) 


 





 


SARS-CoV-2 Antigen (Rapid)


 


 Negative


(NEGATIVE) 


 





 


Urine Collection Type   Unknown  


 


Urine Color   Straw  


 


Urine Clarity   Clear  


 


Urine pH   6.0 (<5.0-8.0)  


 


Urine Specific Gravity


 


 


 <=1.005


(1.000-1.030) 





 


Urine Protein


 


 


 Negative mg/dL


(NEG-TRACE) 





 


Urine Glucose (UA)


 


 


 Negative mg/dL


(NEG) 





 


Urine Ketones (Stick)


 


 


 Negative mg/dL


(NEG) 





 


Urine Blood   Negative (NEG)  


 


Urine Nitrite   Negative (NEG)  


 


Urine Bilirubin   Negative (NEG)  


 


Urine Urobilinogen Dipstick


 


 


 0.2 mg/dL (0.2


mg/dL) 





 


Urine Leukocyte Esterase   Moderate (NEG)  


 


Urine RBC   0 /HPF (0-2)  


 


Urine WBC


 


 


 20-40 /HPF


(0-4) 





 


Urine Squamous Epithelial


Cells 


 


 Mod /LPF 


 





 


Urine Renal Epithelial Cells   Few /LPF  


 


Urine Bacteria   0 /HPF (0-FEW)  


 


Phosphorus Level


 


 


 


 3.8 mg/dL


(2.6-4.7)


 


Test


 3/3/22


07:37 3/3/22


11:54 


 





 


Glucose (Fingerstick)


 113 mg/dL


(70-99) 139 mg/dL


(70-99) 


 














Assessment and Plan


Assessmemt and Plan


Problems


Medical Problems:


(1) Congestive heart failure


Status: Acute  





(2) Hypertensive emergency


Status: Acute  











Comment


Review of Relevant


I have reviewed the following items cammie (where applicable) has been applied.


Medications:





Current Medications








 Medications


  (Trade)  Dose


 Ordered  Sig/Nga


 Route


 PRN Reason  Start Time


 Stop Time Status Last Admin


Dose Admin


 


 Nitroglycerin


  (Nitro-Bid Oint)  1 inch  1X  ONCE


 TP


   3/2/22 16:15


 3/2/22 16:16 DC 3/2/22 16:15





 


 Aspirin


  (Ecotrin)  325 mg  1X  ONCE


 PO


   3/2/22 16:15


 3/2/22 16:16 DC 3/2/22 16:15





 


 Furosemide


  (Lasix)  20 mg  1X  ONCE


 IVP


   3/2/22 19:15


 3/2/22 19:16 DC 3/2/22 19:42





 


 Acetaminophen


  (Tylenol)  650 mg  PRN Q4HRS  PRN


 PO


 TEMP OVER 100.4F OR MILD PAIN  3/2/22 19:30


    3/3/22 00:08





 


 Enoxaparin Sodium


  (Lovenox 40mg


 Syringe)  40 mg  Q24H


 SQ


   3/2/22 21:00


    3/2/22 22:47





 


 Levothyroxine


 Sodium


  (Synthroid)  112 mcg  DAILYAC


 PO


   3/3/22 07:30


    3/3/22 07:30





 


 Lisinopril


  (Prinivil)  20 mg  DAILY


 PO


   3/3/22 09:00


    3/3/22 08:39





 


 Metoprolol


 Succinate


  (Toprol Xl)  100 mg  DAILY


 PO


   3/3/22 09:00


    3/3/22 08:40





 


 Folic Acid


  (Folic Acid)  1 mg  DAILY


 PO


   3/3/22 09:00


    3/3/22 08:39





 


 Sertraline HCl


  (Zoloft)  100 mg  DAILY


 PO


   3/3/22 09:00


    3/3/22 08:40





 


 Verapamil HCl


  (Calan Sr)  240 mg  DAILY


 PO


   3/3/22 09:00


    3/3/22 08:39





 


 Aspirin


  (Ecotrin)  81 mg  HS


 PO


   3/2/22 23:15


    3/3/22 00:05





 


 Atorvastatin


 Calcium


  (Lipitor)  40 mg  QHS


 PO


   3/2/22 23:15


    3/3/22 00:05





 


 Gabapentin


  (Neurontin)  300 mg  TID


 PO


   3/2/22 23:15


    3/3/22 13:43





 


 Trazodone HCl


  (Desyrel)  50 mg  HS


 PO


   3/2/22 23:15


    3/3/22 00:06





 


 Primidone


  (Mysoline)  150 mg  HS


 PO


   3/2/22 23:15


    3/3/22 00:05





 


 Furosemide


  (Lasix)  40 mg  1X  ONCE


 IVP


   3/3/22 12:15


 3/3/22 12:16 DC 3/3/22 12:45














Justifications for Admission


Other Justification


Hypertensive urgency











LUCIAN EUCEDA MD                   Mar 3, 2022 14:03

## 2022-03-03 NOTE — PDOC2
ИРИНА VARGAS APRN 3/3/22 1108:


CARDIAC CONSULT


DATE OF CONSULT


Date of Consult


DATE: 3/3/22 


TIME: 10:59





REASON FOR CONSULT


Reason for Consult:


CHF exacerbation





REFERRING PHYSICIAN


Referring Physician:


Adalberto





SOURCE


Source:  Chart review, Patient





HISTORY OF PRESENT ILLNESS


HISTORY OF PRESENT ILLNESS


This is an 83 yo female admitted for complains of shortness of breath which has 

been ongoing for the past week at least.. Hs been having dry cough. Positive for

orthopnea and PND. also with increasing leg swelling. She has gained 7# in the 

last month. She does have sharp pain mainly subcotal region from mid to left 

anterior chest. She fell the other day because her left knee got weak. No 

associated presyncopal symptoms and no apparent injury. No fever or chills. 

Denies any palpitations.





PAST MEDICAL HISTORY


Past Medical History


Cardiovascular:  CAD (with stenting ), HTN, MI, Hyperlipidemia, Other (SSS s/p 

pacemaker with recent generator change with [facility] [facility]), murmur )


Pulmonary:  COPD


CENTRAL NERVOUS SYSTEM:  Peripheral neuropathy


GI:  GERD


Heme/Onc:  No pertinent hx


Hepatobiliary:  No pertinent hx


Psych:  No pertinent hx


Musculoskeletal:  Osteoarthritis


Infectious disease:  No pertinent hx


ENT:  No pertinent hx


Renal/:  No pertinent hx


Endocrine:  Diabetes, Hypothyroidism


Dermatology:  No pertinent hx





PAST SURGICAL HISTORY


Past Surgical History





Pacemaker, Appendectomy, Cholecystectomy, Tonsillectomy, Hysterectomy, PCI/stent





FAMILY HISTORY


Family History:  Coronary Artery Disease





SOCIAL HISTORY


Smoke:  No


ALCOHOL:  none


Drugs:  None


Lives:  with Family





CURRENT MEDICATIONS


CURRENT MEDICATIONS





Current Medications








 Medications


  (Trade)  Dose


 Ordered  Sig/Nga


 Route


 PRN Reason  Start Time


 Stop Time Status Last Admin


Dose Admin


 


 Nitroglycerin


  (Nitro-Bid Oint)  1 inch  1X  ONCE


 TP


   3/2/22 16:15


 3/2/22 16:16 DC 3/2/22 16:15





 


 Aspirin


  (Ecotrin)  325 mg  1X  ONCE


 PO


   3/2/22 16:15


 3/2/22 16:16 DC 3/2/22 16:15





 


 Furosemide


  (Lasix)  20 mg  1X  ONCE


 IVP


   3/2/22 19:15


 3/2/22 19:16 DC 3/2/22 19:42





 


 Acetaminophen


  (Tylenol)  650 mg  PRN Q4HRS  PRN


 PO


 TEMP OVER 100.4F OR MILD PAIN  3/2/22 19:30


    3/3/22 00:08





 


 Enoxaparin Sodium


  (Lovenox 40mg


 Syringe)  40 mg  Q24H


 SQ


   3/2/22 21:00


    3/2/22 22:47





 


 Levothyroxine


 Sodium


  (Synthroid)  112 mcg  DAILYAC


 PO


   3/3/22 07:30


    3/3/22 07:30





 


 Lisinopril


  (Prinivil)  20 mg  DAILY


 PO


   3/3/22 09:00


    3/3/22 08:39





 


 Metoprolol


 Succinate


  (Toprol Xl)  100 mg  DAILY


 PO


   3/3/22 09:00


    3/3/22 08:40





 


 Folic Acid


  (Folic Acid)  1 mg  DAILY


 PO


   3/3/22 09:00


    3/3/22 08:39





 


 Sertraline HCl


  (Zoloft)  100 mg  DAILY


 PO


   3/3/22 09:00


    3/3/22 08:40





 


 Verapamil HCl


  (Calan Sr)  240 mg  DAILY


 PO


   3/3/22 09:00


    3/3/22 08:39





 


 Aspirin


  (Ecotrin)  81 mg  HS


 PO


   3/2/22 23:15


    3/3/22 00:05





 


 Atorvastatin


 Calcium


  (Lipitor)  40 mg  QHS


 PO


   3/2/22 23:15


    3/3/22 00:05





 


 Gabapentin


  (Neurontin)  300 mg  TID


 PO


   3/2/22 23:15


    3/3/22 08:39





 


 Trazodone HCl


  (Desyrel)  50 mg  HS


 PO


   3/2/22 23:15


    3/3/22 00:06





 


 Primidone


  (Mysoline)  150 mg  HS


 PO


   3/2/22 23:15


    3/3/22 00:05














ALLERGIES


ALLERGIES:  


Coded Allergies:  


     Iodinated Contrast Media (Verified  Allergy, Intermediate, FLU SYMPTOMS, 

NASAL CONGESTION, 3/2/22)


   REACTION WAS 40 YEARS AGO.  NO ALLERGY TO BETADINE, OK WITH


   BENADRYL PRIOR


     Tetracyclines (Verified  Allergy, Intermediate, 3/2/22)


     codeine (Verified  Allergy, Intermediate, 3/2/22)


     nitrofurantoin (Verified  Allergy, Intermediate, 3/2/22)


     tetrabenazine (Verified  Allergy, Intermediate, 3/2/22)


     morphine (Verified  Adverse Reaction, Intermediate, HALLUCINATIONS, 3/2/22)





ROS


Review of System


14 point ROS evaluated with pertinent positives noted per HPI





PHYSICAL EXAM


General:  Alert, Oriented X3, Cooperative, No acute distress


HEENT:  Atraumatic, Mucous membr. moist/pink


Lungs:  Other (basilar crackles)


Heart:  Regular rate (Sr with intermittent pacing), Normal S1, Normal S2, Other 

(distant heart sounds)


Abdomen:  Soft, No tenderness, Other (obese)


Extremities:  No cyanosis, Other (1+ bilateral LE pitting edema)


Skin:  No breakdown, No significant lesion


Neuro:  Normal speech, Sensation intact


Psych/Mental Status:  Mental status NL, Mood NL


MUSCULOSKELETAL:  Osteoarthritic changes both hands





VITALS/I&O


VITALS/I&O:





                                   Vital Signs








  Date Time  Temp Pulse Resp B/P (MAP) Pulse Ox O2 Delivery O2 Flow Rate FiO2


 


3/3/22 08:40  56  169/72    


 


3/3/22 07:00 97.8  18  92 Room Air  





 97.8       











LABS


Lab:





                                Laboratory Tests








Test


 3/2/22


16:00 3/2/22


16:50 3/3/22


06:30 3/3/22


07:00


 


White Blood Count


 4.4 x10^3/uL


(4.0-11.0) 


 


 4.2 x10^3/uL


(4.0-11.0)


 


Red Blood Count


 3.24 x10^6/uL


(3.50-5.40)  L 


 


 2.94 x10^6/uL


(3.50-5.40)  L


 


Hemoglobin


 10.1 g/dL


(12.0-15.5)  L 


 


 9.1 g/dL


(12.0-15.5)  L


 


Hematocrit


 30.6 %


(36.0-47.0)  L 


 


 28.1 %


(36.0-47.0)  L


 


Mean Corpuscular Volume


 95 fL ()


 


 


 96 fL ()





 


Mean Corpuscular Hemoglobin 31 pg (25-35)     31 pg (25-35)  


 


Mean Corpuscular Hemoglobin


Concent 33 g/dL


(31-37) 


 


 32 g/dL


(31-37)


 


Red Cell Distribution Width


 13.4 %


(11.5-14.5) 


 


 13.7 %


(11.5-14.5)


 


Platelet Count


 213 x10^3/uL


(140-400) 


 


 181 x10^3/uL


(140-400)


 


Neutrophils (%) (Auto) 69 % (31-73)     62 % (31-73)  


 


Lymphocytes (%) (Auto) 21 % (24-48)  L   27 % (24-48)  


 


Monocytes (%) (Auto) 6 % (0-9)     7 % (0-9)  


 


Eosinophils (%) (Auto) 3 % (0-3)     4 % (0-3)  H


 


Basophils (%) (Auto) 1 % (0-3)     1 % (0-3)  


 


Neutrophils # (Auto)


 3.1 x10^3/uL


(1.8-7.7) 


 


 2.6 x10^3/uL


(1.8-7.7)


 


Lymphocytes # (Auto)


 0.9 x10^3/uL


(1.0-4.8)  L 


 


 1.1 x10^3/uL


(1.0-4.8)


 


Monocytes # (Auto)


 0.3 x10^3/uL


(0.0-1.1) 


 


 0.3 x10^3/uL


(0.0-1.1)


 


Eosinophils # (Auto)


 0.1 x10^3/uL


(0.0-0.7) 


 


 0.1 x10^3/uL


(0.0-0.7)


 


Basophils # (Auto)


 0.1 x10^3/uL


(0.0-0.2) 


 


 0.0 x10^3/uL


(0.0-0.2)


 


Sodium Level


 146 mmol/L


(136-145)  H 


 


 146 mmol/L


(136-145)  H


 


Potassium Level


 4.3 mmol/L


(3.5-5.1) 


 


 3.6 mmol/L


(3.5-5.1)


 


Chloride Level


 104 mmol/L


() 


 


 104 mmol/L


()


 


Carbon Dioxide Level


 32 mmol/L


(21-32) 


 


 33 mmol/L


(21-32)  H


 


Anion Gap 10 (6-14)     9 (6-14)  


 


Blood Urea Nitrogen


 19 mg/dL


(7-20) 


 


 18 mg/dL


(7-20)


 


Creatinine


 1.3 mg/dL


(0.6-1.0)  H 


 


 1.3 mg/dL


(0.6-1.0)  H


 


Estimated GFR


(Cockcroft-Gault) 39.4  


 


 


 39.4  





 


BUN/Creatinine Ratio 15 (6-20)     


 


Glucose Level


 133 mg/dL


(70-99)  H 


 


 106 mg/dL


(70-99)  H


 


Calcium Level


 8.3 mg/dL


(8.5-10.1)  L 


 


 8.5 mg/dL


(8.5-10.1)


 


Magnesium Level


 1.7 mg/dL


(1.8-2.4)  L 


 


 1.8 mg/dL


(1.8-2.4)


 


Total Bilirubin


 0.2 mg/dL


(0.2-1.0) 


 


 





 


Aspartate Amino Transferase


(AST) 15 U/L (15-37)


 


 


 





 


Alanine Aminotransferase (ALT)


 20 U/L (14-59)


 


 


 





 


Alkaline Phosphatase


 88 U/L


() 


 


 





 


Troponin I High Sensitivity


 14 ng/L (4-50)


 


 


 





 


NT-Pro-B-Type Natriuretic


Peptide 1245 pg/mL


(0-449)  H 


 


 





 


Total Protein


 7.3 g/dL


(6.4-8.2) 


 


 





 


Albumin


 3.1 g/dL


(3.4-5.0)  L 


 


 





 


Albumin/Globulin Ratio


 0.7 (1.0-1.7)


L 


 


 





 


Influenza Type A Antigen


 


 Negative


(NEGATIVE) 


 





 


Influenza Type B Antigen


 


 Negative


(NEGATIVE) 


 





 


SARS-CoV-2 Antigen (Rapid)


 


 Negative


(NEGATIVE) 


 





 


Urine Collection Type   Unknown   


 


Urine Color   Straw   


 


Urine Clarity   Clear   


 


Urine pH


 


 


 6.0 (<5.0-8.0)


 





 


Urine Specific Gravity


 


 


 <=1.005


(1.000-1.030) 





 


Urine Protein


 


 


 Negative mg/dL


(NEG-TRACE) 





 


Urine Glucose (UA)


 


 


 Negative mg/dL


(NEG) 





 


Urine Ketones (Stick)


 


 


 Negative mg/dL


(NEG) 





 


Urine Blood


 


 


 Negative (NEG)


 





 


Urine Nitrite


 


 


 Negative (NEG)


 





 


Urine Bilirubin


 


 


 Negative (NEG)


 





 


Urine Urobilinogen Dipstick


 


 


 0.2 mg/dL (0.2


mg/dL) 





 


Urine Leukocyte Esterase


 


 


 Moderate (NEG)


 





 


Urine RBC   0 /HPF (0-2)   


 


Urine WBC


 


 


 20-40 /HPF


(0-4) 





 


Urine Squamous Epithelial


Cells 


 


 Mod /LPF  


 





 


Urine Renal Epithelial Cells   Few /LPF   


 


Urine Bacteria


 


 


 0 /HPF (0-FEW)


 





 


Phosphorus Level


 


 


 


 3.8 mg/dL


(2.6-4.7)


 


Test


 3/3/22


07:37 


 


 





 


Glucose (Fingerstick)


 113 mg/dL


(70-99)  H 


 


 








                                Laboratory Tests


3/2/22 16:00








3/3/22 07:00








                                Laboratory Tests


3/2/22 16:00








3/3/22 07:00











ECHOCARDIOGRAM


ECHOCARDIOGRAM


CONCLUSION


1. The left ventricular systolic function is normal.


2. The ejection fraction is 60-65%.


3. There is normal LV segmental wall motion.


4. Transmitral Doppler flow pattern is Grade I-abnormal relaxation pattern.


5. Pacemaker wire noted in RV/RA.


6. Mild aortic regurgitation.


7. Trace to mild mitral regurgitation.


8. Trace tricuspid regurgitation.


9. There is no evidence of significant pericardial effusion.


Signed by: Rafa Aceves,


Electronically Approved: 07/13/2021 10:22:14








DATE:     07/13/21 [id number] 0





HEART CATH


HEART CATH





Coronary angiography:


LM: Large caliber vessel with a distal 20% stenosis


LAD: Large caliber vessel with patent proximal stent.


D1: Small caliber vessel with ostial 95% stenosis and a distal subtotal 

occlusion. There is an apical LAD collateral feeding the inferior branch.


Ramus is a moderate to large caliber vessel with mild luminal irregularities


LCX: Moderate caliber non-dominant vessel with mild luminal irregularities


LPL1: Small caliber vessel with mild luminal irregularities


RCA: Large caliber dominant vessel with patent proximal and mid stents. There is

less than 20% in-stent restenosis in the midportion.


RPDA: Moderate caliber vessel with mild luminal irregularities.





CLOSURE:


At case completion the left radial sheath was removed and a Terumo radial band 

was applied with 11 mL of air.  Hemostasis was achieved.





COMPLICATIONS:


No acute complications noted





Conclusion


1. Normal left-sided filling pressures


2. Two-vessel coronary artery disease with patent stents in the LAD and RCA 

unchanged from prior catheterization in 2017





Recommendations


Aggressive Medical Therapy





DATE:     05/13/21 [id number]





ASSESSMENT/PLAN


ASSESSMENT/PLAN


1. Acute on chronic diastolic CHF


2. CKD3


3. GERD


4. CAD: past stents, clinically stable


5. SSS/PPM in situ: [facility] currently Vpaced with hx of SSS


6. HLP


7. HTN urgency


8. Hypothyroidism: on replacement


9. Nontraumatic mechanical fall: due to left knee weakness


10. Atypical CP: MSK





Recommendations


1. Continue with secondary prevention measures


2. Lasix


3. Resume home BP regimen





HYUN BELLA MD 3/3/22 1712:


CARDIAC CONSULT


ASSESSMENT/PLAN


ASSESSMENT/PLAN


Pt. seen and examined. Agree with above NP note. 


Probable etiology of dyspnea and HF is uncontrolled BP. 


Will continue BP mgmt. Outpt ischemic evaluation.











ИРИНА VARGAS           Mar 3, 2022 11:08


HYUN BELLA MD        Mar 3, 2022 17:12

## 2022-03-03 NOTE — EKG
[facility]

              [address] [place] [address]

Test Date:    2022               Test Time:    15:49:29

Pat Name:     CHRISTIANO BUSTILLO              Department:   

Patient ID:   [id number]           Room:         536 1

Gender:       F                        Technician:   

:          [phone]               Requested By: CINDY ALMONTE

Order Number: [id number]           Reading MD:   Bashir Ch MD

                                 Measurements

Intervals                              Axis          

Rate:         75                       P:            37

NV:           206                      QRS:          -1

QRSD:         128                      T:            68

QT:           384                                    

QTc:          431                                    

                           Interpretive Statements

SINUS RHYTHM

PROBABLE V-PACED

Electronically Signed On 3-3-2022 17:11:11 CST by Bashir Ch MD

## 2022-03-03 NOTE — NUR
SW following. Discussed with RN, pt from home with family, room air, cardiac diet, flu and 
Rapid COVID-19 negative. PT/OT ordered. Cardiology following. RN advised no SW needs at this 
time. SW will continue to follow.

## 2022-03-04 VITALS — SYSTOLIC BLOOD PRESSURE: 182 MMHG | DIASTOLIC BLOOD PRESSURE: 68 MMHG

## 2022-03-04 VITALS — DIASTOLIC BLOOD PRESSURE: 68 MMHG | SYSTOLIC BLOOD PRESSURE: 182 MMHG

## 2022-03-04 VITALS — SYSTOLIC BLOOD PRESSURE: 175 MMHG | DIASTOLIC BLOOD PRESSURE: 76 MMHG

## 2022-03-04 VITALS — SYSTOLIC BLOOD PRESSURE: 165 MMHG | DIASTOLIC BLOOD PRESSURE: 67 MMHG

## 2022-03-04 VITALS — SYSTOLIC BLOOD PRESSURE: 142 MMHG | DIASTOLIC BLOOD PRESSURE: 66 MMHG

## 2022-03-04 VITALS — DIASTOLIC BLOOD PRESSURE: 52 MMHG | SYSTOLIC BLOOD PRESSURE: 145 MMHG

## 2022-03-04 VITALS — SYSTOLIC BLOOD PRESSURE: 154 MMHG | DIASTOLIC BLOOD PRESSURE: 56 MMHG

## 2022-03-04 LAB
ANION GAP SERPL CALC-SCNC: 10 MMOL/L (ref 6–14)
BASOPHILS # BLD AUTO: 0 X10^3/UL (ref 0–0.2)
BASOPHILS NFR BLD: 1 % (ref 0–3)
BUN SERPL-MCNC: 19 MG/DL (ref 7–20)
CALCIUM SERPL-MCNC: 8.8 MG/DL (ref 8.5–10.1)
CHLORIDE SERPL-SCNC: 100 MMOL/L (ref 98–107)
CO2 SERPL-SCNC: 34 MMOL/L (ref 21–32)
CREAT SERPL-MCNC: 1.5 MG/DL (ref 0.6–1)
EOSINOPHIL NFR BLD: 0.2 X10^3/UL (ref 0–0.7)
EOSINOPHIL NFR BLD: 4 % (ref 0–3)
ERYTHROCYTE [DISTWIDTH] IN BLOOD BY AUTOMATED COUNT: 13.3 % (ref 11.5–14.5)
GFR SERPLBLD BASED ON 1.73 SQ M-ARVRAT: 33.4 ML/MIN
GLUCOSE SERPL-MCNC: 114 MG/DL (ref 70–99)
HCT VFR BLD CALC: 29.3 % (ref 36–47)
HGB BLD-MCNC: 9.5 G/DL (ref 12–15.5)
LYMPHOCYTES # BLD: 1.1 X10^3/UL (ref 1–4.8)
LYMPHOCYTES NFR BLD AUTO: 27 % (ref 24–48)
MAGNESIUM SERPL-MCNC: 1.7 MG/DL (ref 1.8–2.4)
MCH RBC QN AUTO: 31 PG (ref 25–35)
MCHC RBC AUTO-ENTMCNC: 32 G/DL (ref 31–37)
MCV RBC AUTO: 95 FL (ref 79–100)
MONO #: 0.3 X10^3/UL (ref 0–1.1)
MONOCYTES NFR BLD: 7 % (ref 0–9)
NEUT #: 2.6 X10^3/UL (ref 1.8–7.7)
NEUTROPHILS NFR BLD AUTO: 62 % (ref 31–73)
PLATELET # BLD AUTO: 185 X10^3/UL (ref 140–400)
POTASSIUM SERPL-SCNC: 3.5 MMOL/L (ref 3.5–5.1)
RBC # BLD AUTO: 3.09 X10^6/UL (ref 3.5–5.4)
SODIUM SERPL-SCNC: 144 MMOL/L (ref 136–145)
WBC # BLD AUTO: 4.2 X10^3/UL (ref 4–11)

## 2022-03-04 RX ADMIN — FOLIC ACID SCH MG: 1 TABLET ORAL at 08:56

## 2022-03-04 RX ADMIN — ACETAMINOPHEN PRN MG: 325 TABLET, FILM COATED ORAL at 09:02

## 2022-03-04 RX ADMIN — LISINOPRIL SCH MG: 20 TABLET ORAL at 08:57

## 2022-03-04 RX ADMIN — ENOXAPARIN SODIUM SCH MG: 40 INJECTION SUBCUTANEOUS at 20:32

## 2022-03-04 RX ADMIN — Medication SCH MG: at 08:56

## 2022-03-04 RX ADMIN — GABAPENTIN SCH MG: 300 CAPSULE ORAL at 20:33

## 2022-03-04 RX ADMIN — INSULIN LISPRO SCH UNITS: 100 INJECTION, SOLUTION INTRAVENOUS; SUBCUTANEOUS at 08:00

## 2022-03-04 RX ADMIN — SERTRALINE HYDROCHLORIDE SCH MG: 50 TABLET ORAL at 08:56

## 2022-03-04 RX ADMIN — GABAPENTIN SCH MG: 300 CAPSULE ORAL at 12:55

## 2022-03-04 RX ADMIN — LEVOTHYROXINE SODIUM SCH MCG: 0.11 TABLET ORAL at 08:56

## 2022-03-04 RX ADMIN — GABAPENTIN SCH MG: 300 CAPSULE ORAL at 08:57

## 2022-03-04 RX ADMIN — ATORVASTATIN CALCIUM SCH MG: 40 TABLET, FILM COATED ORAL at 20:33

## 2022-03-04 RX ADMIN — ACETAMINOPHEN PRN MG: 325 TABLET, FILM COATED ORAL at 20:43

## 2022-03-04 RX ADMIN — TRAZODONE HYDROCHLORIDE SCH MG: 50 TABLET ORAL at 20:33

## 2022-03-04 RX ADMIN — INSULIN LISPRO SCH UNITS: 100 INJECTION, SOLUTION INTRAVENOUS; SUBCUTANEOUS at 17:00

## 2022-03-04 RX ADMIN — ASPIRIN SCH MG: 81 TABLET, COATED ORAL at 20:33

## 2022-03-04 RX ADMIN — METOPROLOL SUCCINATE SCH MG: 100 TABLET, FILM COATED, EXTENDED RELEASE ORAL at 08:56

## 2022-03-04 RX ADMIN — INSULIN LISPRO SCH UNITS: 100 INJECTION, SOLUTION INTRAVENOUS; SUBCUTANEOUS at 12:00

## 2022-03-04 RX ADMIN — VERAPAMIL HYDROCHLORIDE SCH MG: 120 TABLET, FILM COATED, EXTENDED RELEASE ORAL at 08:55

## 2022-03-04 RX ADMIN — PRIMIDONE SCH MG: 50 TABLET ORAL at 20:33

## 2022-03-04 NOTE — PDOC
[facility] [facility] PROGRESS NOTE


Date of Service


DOS:


DATE: 3/4/22 


TIME: 11:44





Chief Complaint


Chief Complaint


Assessment/Plan


Hypertensive urgency


Acute on chronic CHF exacerbation


Elevated BNP likely due to volume overload


History of diastolic CHF


Coronary artery disease status post PCI - patent stents as of 1 year ago


HTN - metoprolol and verapamil. May need to adjust meds


HLD - statin


DM2 - sliding scale insulin. Hold actos for now


Anxiety - on zoloft


History of TIA - stable on ASA, statin


S/p dual chamber pacemaker - interrogated





Admit to hospitalist service for further management


Continue telemetry monitoring


IV antihypertensive regimen to maintain systolic blood pressure between [id number]


Strict I/O


Monitor urine output


Sodium and fluid restriction


Encourage DASH diet


IV Lasix daily as needed


Cardiology consult


Resume cardioprotective medications


Heparin for DVT prophylaxis


Protonix GI prophylaxis


ADA diet


CODE STATUS [full


Discussed with RN and SW


Disposition inpatient management as above


DPOA: Son





History of Present Illness


History of Present Illness


3/4/2020


Patient seen and examined


Discussed with RN


Chart reviewed


Discussed with case management


Blood pressures are still in the 170s


Hope to discharge tomorrow











82-year-old female with past medical history of CAD, COPD, depression, diabetes 

mellitus type 2, hypothyroidism, and diastolic CHF comes in with shortness of 

breath.  Patient states she has been compliant with her all her medications.  

Unfortunately she has not been noncompliant with her diet and she has likely eat

 a lot of salty foods.  She lives with her 2 sons that mainly cooks and buys 

food for her.  Denies any lower extremity swelling.  Denies fevers, chest pain, 

palpitations or syncope.  Patient does endorse orthopnea at night.





3/3/2022


No acute events overnight.  Patient seen examined bedside.  Blood pressures are 

better controlled.  Afebrile.





Vitals/I&O


Vitals/I&O:





                                   Vital Signs








  Date Time  Temp Pulse Resp B/P (MAP) Pulse Ox O2 Delivery O2 Flow Rate FiO2


 


3/4/22 08:57  63  175/76    


 


3/4/22 07:00 97.7  20  94   





 97.7       


 


3/4/22 03:56      Room Air  











Physical Exam


General:  No acute distress, Other (Appears comfortable/sleeping)


Heart:  Regular rate (Sr with intermittent pacing), Normal S1, Normal S2, Other 

(distant heart sounds)


Lungs:  Clear


Abdomen:  Soft, No tenderness, Other (obese)


Extremities:  No cyanosis, Other (1+ bilateral LE pitting edema)


Skin:  No breakdown, No significant lesion





Labs


Labs:





Laboratory Tests








Test


 3/3/22


11:54 3/3/22


16:52 3/4/22


05:30 3/4/22


07:52


 


Glucose (Fingerstick)


 139 mg/dL


(70-99) 127 mg/dL


(70-99) 


 115 mg/dL


(70-99)


 


White Blood Count


 


 


 4.2 x10^3/uL


(4.0-11.0) 





 


Red Blood Count


 


 


 3.09 x10^6/uL


(3.50-5.40) 





 


Hemoglobin


 


 


 9.5 g/dL


(12.0-15.5) 





 


Hematocrit


 


 


 29.3 %


(36.0-47.0) 





 


Mean Corpuscular Volume   95 fL ()  


 


Mean Corpuscular Hemoglobin   31 pg (25-35)  


 


Mean Corpuscular Hemoglobin


Concent 


 


 32 g/dL


(31-37) 





 


Red Cell Distribution Width


 


 


 13.3 %


(11.5-14.5) 





 


Platelet Count


 


 


 185 x10^3/uL


(140-400) 





 


Neutrophils (%) (Auto)   62 % (31-73)  


 


Lymphocytes (%) (Auto)   27 % (24-48)  


 


Monocytes (%) (Auto)   7 % (0-9)  


 


Eosinophils (%) (Auto)   4 % (0-3)  


 


Basophils (%) (Auto)   1 % (0-3)  


 


Neutrophils # (Auto)


 


 


 2.6 x10^3/uL


(1.8-7.7) 





 


Lymphocytes # (Auto)


 


 


 1.1 x10^3/uL


(1.0-4.8) 





 


Monocytes # (Auto)


 


 


 0.3 x10^3/uL


(0.0-1.1) 





 


Eosinophils # (Auto)


 


 


 0.2 x10^3/uL


(0.0-0.7) 





 


Basophils # (Auto)


 


 


 0.0 x10^3/uL


(0.0-0.2) 





 


Sodium Level


 


 


 144 mmol/L


(136-145) 





 


Potassium Level


 


 


 3.5 mmol/L


(3.5-5.1) 





 


Chloride Level


 


 


 100 mmol/L


() 





 


Carbon Dioxide Level


 


 


 34 mmol/L


(21-32) 





 


Anion Gap   10 (6-14)  


 


Blood Urea Nitrogen


 


 


 19 mg/dL


(7-20) 





 


Creatinine


 


 


 1.5 mg/dL


(0.6-1.0) 





 


Estimated GFR


(Cockcroft-Gault) 


 


 33.4 


 





 


Glucose Level


 


 


 114 mg/dL


(70-99) 





 


Calcium Level


 


 


 8.8 mg/dL


(8.5-10.1) 





 


Magnesium Level


 


 


 1.7 mg/dL


(1.8-2.4) 














Assessment and Plan


Assessmemt and Plan


Problems


Medical Problems:


(1) Congestive heart failure


Status: Acute  





(2) Hypertensive emergency


Status: Acute  





Hypertensive urgency


Acute on chronic CHF exacerbation


Elevated BNP likely due to volume overload


History of diastolic CHF


Coronary artery disease status post PCI - patent stents as of 1 year ago


HTN - metoprolol and verapamil. May need to adjust meds


HLD - statin


DM2 - sliding scale insulin. Hold actos for now


Anxiety - on zoloft


History of TIA - stable on ASA, statin


S/p dual chamber pacemaker - interrogated





Plan


Continue cardiac monitoring


We are adjusting her blood pressure meds (on hydralazine lisinopril 

hydrochlorothiazide verapamil metoprolol and as needed labetalol)


Home meds


DVT prophylaxis


Full code


PT OT


Encourage p.o. intake


Appreciate cardiology input


Hope to discharge tomorrow if she is improved





Comment


Review of Relevant


I have reviewed the following items cammie (where applicable) has been applied.


Medications:





Current Medications








 Medications


  (Trade)  Dose


 Ordered  Sig/Nga


 Route


 PRN Reason  Start Time


 Stop Time Status Last Admin


Dose Admin


 


 Thiamine


 Mononitrate


  (Vitamin B-1)  300 mg  DAILY


 PO


   3/3/22 21:00


    3/4/22 08:56





 


 Furosemide


  (Lasix)  40 mg  1X  ONCE


 IVP


   3/3/22 12:15


 3/3/22 12:16 DC 3/3/22 12:45














Justifications for Admission


Other Justification


Hypertensive urgency











PRACHI RIGGS III DO            Mar 4, 2022 11:45

## 2022-03-04 NOTE — PDOC
ИРИНА VARGAS APRN 3/4/22 1115:


CARDIO Progress Notes


Date and Time


Date of Service


03/04/2022


Time of Evaluation


1050





Subjective


Subjective:  No Chest Pain, No shortness of breath, No Palpitations





Vitals


Vitals





Vital Signs








  Date Time  Temp Pulse Resp B/P (MAP) Pulse Ox O2 Delivery O2 Flow Rate FiO2


 


3/4/22 08:57  63  175/76    


 


3/4/22 07:00 97.7  20  94   





 97.7       


 


3/4/22 03:56      Room Air  








Weight


Weight [ ]





Laboratory


Labs





Laboratory Tests








Test


 3/3/22


11:54 3/3/22


16:52 3/4/22


05:30 3/4/22


07:52


 


Glucose (Fingerstick)


 139 mg/dL


(70-99) 127 mg/dL


(70-99) 


 115 mg/dL


(70-99)


 


White Blood Count


 


 


 4.2 x10^3/uL


(4.0-11.0) 





 


Red Blood Count


 


 


 3.09 x10^6/uL


(3.50-5.40) 





 


Hemoglobin


 


 


 9.5 g/dL


(12.0-15.5) 





 


Hematocrit


 


 


 29.3 %


(36.0-47.0) 





 


Mean Corpuscular Volume   95 fL ()  


 


Mean Corpuscular Hemoglobin   31 pg (25-35)  


 


Mean Corpuscular Hemoglobin


Concent 


 


 32 g/dL


(31-37) 





 


Red Cell Distribution Width


 


 


 13.3 %


(11.5-14.5) 





 


Platelet Count


 


 


 185 x10^3/uL


(140-400) 





 


Neutrophils (%) (Auto)   62 % (31-73)  


 


Lymphocytes (%) (Auto)   27 % (24-48)  


 


Monocytes (%) (Auto)   7 % (0-9)  


 


Eosinophils (%) (Auto)   4 % (0-3)  


 


Basophils (%) (Auto)   1 % (0-3)  


 


Neutrophils # (Auto)


 


 


 2.6 x10^3/uL


(1.8-7.7) 





 


Lymphocytes # (Auto)


 


 


 1.1 x10^3/uL


(1.0-4.8) 





 


Monocytes # (Auto)


 


 


 0.3 x10^3/uL


(0.0-1.1) 





 


Eosinophils # (Auto)


 


 


 0.2 x10^3/uL


(0.0-0.7) 





 


Basophils # (Auto)


 


 


 0.0 x10^3/uL


(0.0-0.2) 





 


Sodium Level


 


 


 144 mmol/L


(136-145) 





 


Potassium Level


 


 


 3.5 mmol/L


(3.5-5.1) 





 


Chloride Level


 


 


 100 mmol/L


() 





 


Carbon Dioxide Level


 


 


 34 mmol/L


(21-32) 





 


Anion Gap   10 (6-14)  


 


Blood Urea Nitrogen


 


 


 19 mg/dL


(7-20) 





 


Creatinine


 


 


 1.5 mg/dL


(0.6-1.0) 





 


Estimated GFR


(Cockcroft-Gault) 


 


 33.4 


 





 


Glucose Level


 


 


 114 mg/dL


(70-99) 





 


Calcium Level


 


 


 8.8 mg/dL


(8.5-10.1) 





 


Magnesium Level


 


 


 1.7 mg/dL


(1.8-2.4) 














Microbiology


Micro





Microbiology


3/3/22 Urine Culture - Preliminary, Resulted


         Escherichia Coli





Physical Exam


HEENT:  Neck Supple W Full Motion


Chest:  Symmetric


LUNGS:  Other (diminished)


Abdomen:  Soft N/T


Extremities:  No Calf Tenderness


Neurology:  alert, oriented, follow commands





Assessment


Assessment


1. Acute on chronic diastolic CHF: compensated


2. CKD3


3. GERD


4. CAD: past stents, clinically stable


5. SSS/PPM in situ: [facility] currently Vpaced with hx of SSS


6. HLP


7. HTN urgency: labile


8. Hypothyroidism: on replacement


9. Nontraumatic mechanical fall: due to left knee weakness


10. Atypical CP: MSK





Recommendations


1. Continue with secondary prevention measures


2. Resume home BP regimen. Increase lisinopril and add HCTZ





Justicifation of Admission Dx:


Justifications for Admission:


Justification of Admission Dx:  Yes





HYUN BELLA MD 3/4/22 1640:


CARDIO Progress Notes


Plan


Plan


The patient was seen and interviewed as well as examined at the bedside. The 

chart was reviewed. The case was discussed. Agree with the plan of care.











ИРИНА VARGAS           Mar 4, 2022 11:15


HYUN BELLA MD        Mar 4, 2022 16:40

## 2022-03-04 NOTE — NUR
SW following. Discussed with RN, pt from home with family, room air, cardiac diet. PT/OT 
recommending SNF. SW met with pt, pt declining SNF and home health at this time - wants to 
go home and spend time with her family. Pt understands she can reach out to her PCP if she 
changes her mind. Pt reported she had tried to get a hospital bed through her PCP and 
wondered about that - SW recommend she call her PCP to see what the progress is with that. 
Pt agreed and declined and further SW needs. RN notified. SW will continue to follow.

## 2022-03-05 VITALS — SYSTOLIC BLOOD PRESSURE: 185 MMHG | DIASTOLIC BLOOD PRESSURE: 74 MMHG

## 2022-03-05 VITALS — DIASTOLIC BLOOD PRESSURE: 76 MMHG | SYSTOLIC BLOOD PRESSURE: 179 MMHG

## 2022-03-05 VITALS — SYSTOLIC BLOOD PRESSURE: 138 MMHG | DIASTOLIC BLOOD PRESSURE: 65 MMHG

## 2022-03-05 VITALS — DIASTOLIC BLOOD PRESSURE: 79 MMHG | SYSTOLIC BLOOD PRESSURE: 154 MMHG

## 2022-03-05 LAB
ANION GAP SERPL CALC-SCNC: 10 MMOL/L (ref 6–14)
BASOPHILS # BLD AUTO: 0 X10^3/UL (ref 0–0.2)
BASOPHILS NFR BLD: 1 % (ref 0–3)
BUN SERPL-MCNC: 20 MG/DL (ref 7–20)
CALCIUM SERPL-MCNC: 9.2 MG/DL (ref 8.5–10.1)
CHLORIDE SERPL-SCNC: 101 MMOL/L (ref 98–107)
CO2 SERPL-SCNC: 32 MMOL/L (ref 21–32)
CREAT SERPL-MCNC: 1.4 MG/DL (ref 0.6–1)
EOSINOPHIL NFR BLD: 0.2 X10^3/UL (ref 0–0.7)
EOSINOPHIL NFR BLD: 6 % (ref 0–3)
ERYTHROCYTE [DISTWIDTH] IN BLOOD BY AUTOMATED COUNT: 13.4 % (ref 11.5–14.5)
GFR SERPLBLD BASED ON 1.73 SQ M-ARVRAT: 36.2 ML/MIN
GLUCOSE SERPL-MCNC: 131 MG/DL (ref 70–99)
HCT VFR BLD CALC: 31.8 % (ref 36–47)
HGB BLD-MCNC: 10.9 G/DL (ref 12–15.5)
LYMPHOCYTES # BLD: 1 X10^3/UL (ref 1–4.8)
LYMPHOCYTES NFR BLD AUTO: 25 % (ref 24–48)
MAGNESIUM SERPL-MCNC: 1.8 MG/DL (ref 1.8–2.4)
MCH RBC QN AUTO: 32 PG (ref 25–35)
MCHC RBC AUTO-ENTMCNC: 34 G/DL (ref 31–37)
MCV RBC AUTO: 93 FL (ref 79–100)
MONO #: 0.3 X10^3/UL (ref 0–1.1)
MONOCYTES NFR BLD: 7 % (ref 0–9)
NEUT #: 2.5 X10^3/UL (ref 1.8–7.7)
NEUTROPHILS NFR BLD AUTO: 61 % (ref 31–73)
PLATELET # BLD AUTO: 209 X10^3/UL (ref 140–400)
POTASSIUM SERPL-SCNC: 3.7 MMOL/L (ref 3.5–5.1)
RBC # BLD AUTO: 3.43 X10^6/UL (ref 3.5–5.4)
SODIUM SERPL-SCNC: 143 MMOL/L (ref 136–145)
WBC # BLD AUTO: 4.1 X10^3/UL (ref 4–11)

## 2022-03-05 RX ADMIN — FOLIC ACID SCH MG: 1 TABLET ORAL at 09:59

## 2022-03-05 RX ADMIN — INSULIN LISPRO SCH UNITS: 100 INJECTION, SOLUTION INTRAVENOUS; SUBCUTANEOUS at 17:00

## 2022-03-05 RX ADMIN — SERTRALINE HYDROCHLORIDE SCH MG: 50 TABLET ORAL at 09:58

## 2022-03-05 RX ADMIN — INSULIN LISPRO SCH UNITS: 100 INJECTION, SOLUTION INTRAVENOUS; SUBCUTANEOUS at 08:00

## 2022-03-05 RX ADMIN — ACETAMINOPHEN PRN MG: 325 TABLET, FILM COATED ORAL at 10:06

## 2022-03-05 RX ADMIN — GABAPENTIN SCH MG: 300 CAPSULE ORAL at 09:57

## 2022-03-05 RX ADMIN — LEVOTHYROXINE SODIUM SCH MCG: 0.11 TABLET ORAL at 09:59

## 2022-03-05 RX ADMIN — INSULIN LISPRO SCH UNITS: 100 INJECTION, SOLUTION INTRAVENOUS; SUBCUTANEOUS at 12:47

## 2022-03-05 RX ADMIN — VERAPAMIL HYDROCHLORIDE SCH MG: 120 TABLET, FILM COATED, EXTENDED RELEASE ORAL at 09:58

## 2022-03-05 RX ADMIN — GABAPENTIN SCH MG: 300 CAPSULE ORAL at 16:02

## 2022-03-05 RX ADMIN — Medication SCH MG: at 09:58

## 2022-03-05 RX ADMIN — METOPROLOL SUCCINATE SCH MG: 100 TABLET, FILM COATED, EXTENDED RELEASE ORAL at 09:58

## 2022-03-05 NOTE — DS
DATE OF DISCHARGE: 03/05/2022

ADMITTING DIAGNOSIS:  Acute on chronic systolic and diastolic heart failure.



DISCHARGE DIAGNOSES:  Resolving heart failure, history of coronary artery 

disease with stents a year ago, hypertension, hyperlipidemia, diabetes, anxiety,

transient ischemic attacks, pacemaker.



HOSPITAL COURSE:  The patient is a pleasant, middle-aged female who presented 

with acute on chronic systolic and diastolic heart failure.  We admitted her, 

did cardiac enzymes, echocardiogram.  We consulted Cardiology.  Today, I saw her

and examined her.  She is doing well and wants to go home.  We plan to 

discharge.



DISPOSITION:  Home.



ACTIVITY:  As tolerated.



DIET:  Cardiac.



DISCHARGE MEDICATIONS:  Please see the MRAD.  Hydrochlorothiazide 12.5 a day, 

magnesium 800 a day, albuterol, aspirin 81 a day, atorvastatin 40 a day, 

Tessalon Perles p.r.n., dicyclomine 10 p.r.n., Allegra-D 60 a day, folic acid 

0.4 a day, gabapentin 300 t.i.d., Synthroid 112 a day, lisinopril 20 a day, 

loperamide, metoprolol 100 a day, vitamins, p.r.n. nitro, Protonix 40 a day, 

Actos 15 a day, Mysoline 50 mg 3 tabs at bedtime, sertraline 100 a day, Ultram 

50 q.6 hours, trazodone 50 at bedtime and verapamil 240 a day.



TOTAL TIME:  36 minutes.







SURYA

DR: INDERJIT/kandis   DD: 03/05/2022 13:30

DT: 03/05/2022 13:46   TID: [id number]

## 2022-03-05 NOTE — NUR
DISCHARGE INSTRUCTIONS GIVEN, QUESTIONS AND CONCERNS ANSWERED, PATIENT AND SON AT THE 
BEDSIDE VERBALIZED UNDERSTANDING OF DISCHARGE INFORMATION INCLUDING TAKING ALL MEDICATIONS 
AS INSTRUCTED AND FOLLOWING UP WITH HER PRIMARY PROVIDER IN 1-2 WEEKS, PATIENT INFORMED THAT 
[facility] WILL FOLLOW HER CARE AT HOME STARTING TUESDAY.

## 2022-03-05 NOTE — PDOC
PROGRESS NOTES


Date of Service:


DATE: 3/5/22 


TIME: 09:41





Subjective


Subjective


No new complaints





Objective


Objective





Vital Signs








  Date Time  Temp Pulse Resp B/P (MAP) Pulse Ox O2 Delivery O2 Flow Rate FiO2


 


3/5/22 03:00 97.7 67 18 138/65 (89) 95 Room Air  





 97.7       














Intake and Output 


 


 3/5/22





 07:00


 


Intake Total 610 ml


 


Balance 610 ml


 


 


 


Intake Oral 610 ml


 


# Voids 3


 


# Bowel Movements 2











Physical Exam


Abdomen:  Soft, No tenderness, Other (obese)


Heart:  Regular rate (Sr with intermittent pacing), Normal S1, Normal S2, Other 

(distant heart sounds)


Extremities:  No cyanosis, Other (1+ bilateral LE pitting edema)


General:  No acute distress, Other (Appears comfortable/sleeping)


HEENT:  Atraumatic, Mucous membr. moist/pink


Lungs:  Other (basilar crackles)


MUSCULOSKELETAL:  Osteoarthritic changes both hands


Neuro:  Normal speech, Sensation intact


Psych/Mental Status:  Mental status NL, Mood NL


Skin:  No breakdown, No significant lesion





Assessment


Assessment


1. Acute on chronic diastolic CHF: compensated


2. CKD3


3. GERD


4. CAD: past stents, clinically stable


5. SSS/PPM in situ: [facility] currently Vpaced with hx of SSS


6. HLP


7. HTN urgency: labile


8. Hypothyroidism: on replacement


9. Nontraumatic mechanical fall: due to left knee weakness


10. Atypical CP: MSK





Recommendations


1. Continue with secondary prevention measures


2.  Change metoprolol to labetalol for better blood pressure control


3.  Work-up for secondary causes as an outpatient





Plan


Plan of Care


Problems


Medical Problems:


(1) Congestive heart failure


Status: Acute  





(2) Hypertensive emergency


Status: Acute  











Comment


Review of Relevant


I have reviewed the following items cammie (where applicable) has been applied.


Labs





Laboratory Tests








Test


 3/4/22


12:24 3/4/22


16:56 3/5/22


07:21 3/5/22


09:10


 


Glucose (Fingerstick)


 122 mg/dL


(70-99) 123 mg/dL


(70-99) 112 mg/dL


(70-99) 





 


Sodium Level


 


 


 


 143 mmol/L


(136-145)


 


Potassium Level


 


 


 


 3.7 mmol/L


(3.5-5.1)


 


Chloride Level


 


 


 


 101 mmol/L


()


 


Carbon Dioxide Level


 


 


 


 32 mmol/L


(21-32)


 


Anion Gap    10 (6-14) 


 


Blood Urea Nitrogen


 


 


 


 20 mg/dL


(7-20)


 


Creatinine


 


 


 


 1.4 mg/dL


(0.6-1.0)


 


Estimated GFR


(Cockcroft-Gault) 


 


 


 36.2 





 


Glucose Level


 


 


 


 131 mg/dL


(70-99)


 


Calcium Level


 


 


 


 9.2 mg/dL


(8.5-10.1)


 


Magnesium Level


 


 


 


 1.8 mg/dL


(1.8-2.4)








Microbiology


3/3/22 Urine Culture - Final, Complete


         Escherichia Coli


Medications





Current Medications


Hydrochlorothiazide (Microzide) 12.5 mg 1X  ONCE PO  Last administered on 

3/4/22at 13:06;  Start 3/4/22 at 12:00;  Stop 3/4/22 at 12:01;  Status DC


Hydrochlorothiazide (Microzide) 12.5 mg DAILY PO ;  Start 3/5/22 at 09:00


Lisinopril (Prinivil) 20 mg 1X  ONCE PO  Last administered on 3/4/22at 13:05;  

Start 3/4/22 at 12:00;  Stop 3/4/22 at 12:01;  Status DC


Lisinopril (Prinivil) 40 mg DAILY PO ;  Start 3/5/22 at 09:00


Magnesium Oxide (Magnesium Oxide) 800 mg DAILY PO ;  Start 3/5/22 at 09:00


Vitals/I & O





Vital Sign - Last 24 Hours








 3/4/22 3/4/22 3/4/22 3/4/22





 13:05 13:06 13:30 15:00


 


Temp    97.9





    97.9


 


Pulse 77   67


 


Resp  18 18 18


 


B/P (MAP) 150/65   182/68 (106)


 


Pulse Ox  97 95 98


 


O2 Delivery  Room Air Room Air 


 


    





    





 3/4/22 3/4/22 3/4/22 3/4/22





 17:55 19:00 20:19 23:00


 


Temp 98.2 98.1  98.1





 98.2 98.1  98.1


 


Pulse 67 71  70


 


Resp 20 17  18


 


B/P (MAP) 165/67 (99) 154/56 (88)  145/52 (83)


 


Pulse Ox 96 96  93


 


O2 Delivery Room Air Room Air Room Air Room Air


 


    





    





 3/5/22   





 03:00   


 


Temp 97.7   





 97.7   


 


Pulse 67   


 


Resp 18   


 


B/P (MAP) 138/65 (89)   


 


Pulse Ox 95   


 


O2 Delivery Room Air   














Intake and Output 0  


 


 3/4/22 3/4/22 3/5/22





 15:00 23:00 07:00


 


Intake Total  210 ml 400 ml


 


Balance  210 ml 400 ml

















NIKHIL PAULINO MD            Mar 5, 2022 09:41

## 2022-03-05 NOTE — SNU/HH DC
DISCHARGE WITH HOME HEALTH


DISCHARGE INFORMATION:


Final Diagnosis:


Problems


Medical Problems:


(1) Congestive heart failure


Status: Acute  





(2) Hypertensive emergency


Status: Acute  








Condition on Discharge:  Stable





CODE STATUS:


Code Status:  Full





HOME HEALTH:


Face to Face:


I certify this patient is under my care and that I, or a nurse practitioner or 

physician's assistant working with me, had a face to face encounter that meets 

the physician face to face encounter requirements with this patient on [].


Medical Complications:  CHF


Skilled Nursing For:  Assess Cardiopulm Status


RN For Eval/Treatment:  Yes


Physical Therapy For:  Evalulation/Treatment


Occupational Therapy For:  Evaluation/Treatment


Home Health Aide For:  Self-care


MSW For:  Community [employer]


Pt Meets Homebound Status:  Unsteady balance w/ amb,





POST DISCHARGE ORDERS:


Activity Instructions for Disc:  Activity as tolerated


Weight Bearing Status after Di:  As tolerated


Bathing Instructions:  No Tub Bath until see 


DIET AFTER DISCHARGE:  Cardiac


Wound/Incision Care:  No wound care needed





CHECKS AFTER DISCHARGE:


Checks after discharge:  Check blood press - daily, Check blood sugar, ac/hs, 

Check your Temp as needed





TREATMENT/EQUIPMENT ORDERS:


Adaptive Equipment Issued:  None





CERTIFICATION STATEMENT:


Certification Statement:


Certification Statement: Based on the above finding, I certify that this patient

 is confined to the home and needs intermittent skilled nursing care, physical 

therapy and/or speech therapy, or continues to need occupational therapy.~ This 

patient is under my care, and I have initiated the establishment of the plan of 

care.~ This patient will be followed by myself or a community physician who will

 periodically review the plan of care.


Home Meds


Active Scripts


Magnesium Oxide (MAGNESIUM OXIDE) 400 Mg Tablet, 800 MG PO DAILY for . for 30 

Days, #60 TAB


   Prov:CASTLE,NIAL K III DO         3/5/22


Hydrochlorothiazide (HYDROCHLOROTHIAZIDE CAPSULE  **) 12.5 Mg Capsule, 12.5 MG 

PO DAILY for . for 30 Days, #30 CAP


   Prov:CASTLE,NIAL K III DO         3/5/22


Atorvastatin Calcium (ATORVASTATIN CALCIUM) 40 Mg Tablet, 40 MG PO QHS for 

cholesterol for 30 Days, #30 TAB 2 Refills


   Prov:LUCIAN EUCEDA MD         1/23/22


Pantoprazole Sodium (PANTOPRAZOLE SODIUM  **) 40 Mg Tablet., 40 MG PO BIDAC 

for reflux disease for 30 Days, #60 TAB.SR 2 Refills


   Prov:LUCIAN EUCEDA MD         1/23/22


Dicyclomine Hcl (DICYCLOMINE HCL) 10 Mg Capsule, 10 MG PO PRN TID PRN for abd 

pain for 10 Days, #30 CAP


   Prov:CHET GOMEZ MD         8/14/19


Metoprolol Succinate (METOPROLOL SUCCINATE ( XL )) 100 Mg Tab.er.24h, 100 MG PO 

DAILY, #30 TAB 0 Refills


   Prov:RODRÍGUEZ FENG MD         11/2/16


Reported Medications


Primidone (MYSOLINE) 50 Mg Tablet, 3 TAB PO QHS for TREMORS for 30 Days, #90 TAB

 0 Refills


   5/12/21


Loperamide HCl (Imodium A-D) 2 Mg Capsule, 2 MG PO TID PRN PRN for DIARRHEA, CAP


   5/12/21


Omega-3 Fatty Acids/Fish Oil (FISH OIL 1,000 MG SOFTGEL) 1 Each Capsule, 1000 

EACH PO DAILY for SUPPLEMENT, CAP


   5/12/21


Folic Acid (FOLIC ACID) 0.4 Mg Tablet, 0.4 MG PO DAILY for SUPPLEMENT, TAB


   5/12/21


Tramadol Hcl (TRAMADOL HCL) 50 Mg Tablet, 50 MG PO DAILY PRN for PAIN, TAB 0 

Refills


   6/24/19


Benzonatate (TESSALON PERLE) 100 Mg Capsule, 1 CAP PO PRN TID PRN for COUGH, #21

 CAP


   6/24/19


Pioglitazone Hcl (ACTOS) 15 Mg Tablet, 1 TAB PO DAILY for f, #30 TAB 5 Refills


   2/27/19


Lisinopril (LISINOPRIL) 20 Mg Tablet, 1 TAB PO DAILY for HTN, #30 TAB 5 Refills


   2/27/19


Gabapentin (GABAPENTIN **) 300 Mg Capsule, 300 MG PO TID for NEUROGENIC PAIN, 

CAP


   2/27/19


Verapamil Hcl (VERAPAMIL ER) 240 Mg Cap24h.pel, 240 MG PO DAILY, CAP.SR


   10/9/17


Levothyroxine Sodium (LEVOTHYROXINE SODIUM) 112 Mcg Tablet, 112 MCG PO DAILYAC 

for THYROID SUPPLEMENT, #30 TAB 0 Refills


   10/9/17


Albuterol Sulfate (PROAIR HFA INHALER) 8.5 Gm Hfa.aer.ad, 1 PUFF INH PRN Q6HRS 

PRN for SHORTNESS OF BREATH, INHALER 0 Refills


   7/6/15


Trazodone Hcl (TRAZODONE HCL) 50 Mg Tablet, 50 MG PO HS, TAB


   7/6/15


Sertraline Hcl (SERTRALINE HCL) 100 Mg Tablet, 100 MG PO DAILY for ANTI-

DEPRESSANT, TAB 0 Refills


   7/6/15


Ipratropium/Albuterol Sulfate (COMBIVENT RESPIMAT INHAL) 4 Gm Aer.w.adap, 2 INH 

IH QID, INHALER


   7/6/15


Fexofenadine Hcl (ALLEGRA ALLERGY) 60 Mg Tablet, 60 MG PO DAILY, TAB


   7/6/15


Nitroglycerin (NITROSTAT) 0.4 Mg Tab.subl, 1 TAB SL UD, #100 TAB 3 Refills


   7/6/15


Aspirin (ASPIR 81) 81 Mg Tablet.dr, 1 TAB PO DAILY for heart health, #30 TAB 5 

Refills


   7/6/15


Multivitamin (MULTI VITAMIN DAILY) 1 Each Tablet, 1 EACH PO


   7/6/15


Discontinued Reported Medications


Gemfibrozil (LOPID) 600 Mg Tablet, 1 TAB PO BID for high cholestrol , #60 TAB 5 

Refills


   2/27/19











PRACHI RIGGS III DO            Mar 5, 2022 12:23
